# Patient Record
Sex: MALE | Race: WHITE | Employment: FULL TIME | URBAN - METROPOLITAN AREA
[De-identification: names, ages, dates, MRNs, and addresses within clinical notes are randomized per-mention and may not be internally consistent; named-entity substitution may affect disease eponyms.]

---

## 2019-02-21 ENCOUNTER — APPOINTMENT (OUTPATIENT)
Dept: GENERAL RADIOLOGY | Age: 64
End: 2019-02-21
Payer: COMMERCIAL

## 2019-02-21 ENCOUNTER — HOSPITAL ENCOUNTER (EMERGENCY)
Age: 64
Discharge: HOME | End: 2019-02-21
Attending: EMERGENCY MEDICINE
Payer: COMMERCIAL

## 2019-02-21 VITALS
SYSTOLIC BLOOD PRESSURE: 130 MMHG | WEIGHT: 221.56 LBS | BODY MASS INDEX: 33.58 KG/M2 | OXYGEN SATURATION: 97 % | HEART RATE: 57 BPM | HEIGHT: 68 IN | RESPIRATION RATE: 16 BRPM | TEMPERATURE: 97.8 F | DIASTOLIC BLOOD PRESSURE: 72 MMHG

## 2019-02-21 DIAGNOSIS — S46.812A STRAIN OF LEFT DELTOID MUSCLE, INITIAL ENCOUNTER: Primary | ICD-10-CM

## 2019-02-21 PROCEDURE — 73030 X-RAY EXAM OF SHOULDER: CPT

## 2019-02-21 PROCEDURE — 99283 EMERGENCY DEPT VISIT LOW MDM: CPT

## 2019-02-21 RX ORDER — LIDOCAINE 50 MG/G
1 PATCH TOPICAL DAILY
Qty: 30 PATCH | Refills: 0 | Status: SHIPPED | OUTPATIENT
Start: 2019-02-21 | End: 2019-03-23

## 2019-02-21 RX ORDER — METOPROLOL SUCCINATE 50 MG/1
TABLET, EXTENDED RELEASE ORAL
COMMUNITY
Start: 2018-11-26

## 2019-02-21 RX ORDER — LANOLIN ALCOHOL/MO/W.PET/CERES
5000 CREAM (GRAM) TOPICAL DAILY
COMMUNITY
End: 2020-11-02 | Stop reason: ALTCHOICE

## 2019-02-21 ASSESSMENT — PAIN DESCRIPTION - ORIENTATION: ORIENTATION: LEFT

## 2019-02-21 ASSESSMENT — PAIN SCALES - GENERAL
PAINLEVEL_OUTOF10: 4
PAINLEVEL_OUTOF10: 6

## 2019-02-21 ASSESSMENT — ENCOUNTER SYMPTOMS
COUGH: 0
RESPIRATORY NEGATIVE: 1
SHORTNESS OF BREATH: 0
GASTROINTESTINAL NEGATIVE: 1
DIARRHEA: 0
EYES NEGATIVE: 1
NAUSEA: 0
VOMITING: 0
BACK PAIN: 0
CONSTIPATION: 0
ABDOMINAL PAIN: 0

## 2019-02-21 ASSESSMENT — PAIN DESCRIPTION - DESCRIPTORS: DESCRIPTORS: ACHING

## 2019-02-21 ASSESSMENT — PAIN DESCRIPTION - LOCATION: LOCATION: SHOULDER

## 2019-02-21 ASSESSMENT — PAIN DESCRIPTION - PAIN TYPE: TYPE: ACUTE PAIN

## 2020-09-23 ENCOUNTER — OFFICE VISIT (OUTPATIENT)
Dept: ORTHOPEDIC SURGERY | Age: 65
End: 2020-09-23
Payer: COMMERCIAL

## 2020-09-23 VITALS — BODY MASS INDEX: 30.31 KG/M2 | WEIGHT: 200 LBS | HEIGHT: 68 IN

## 2020-09-23 PROCEDURE — 3017F COLORECTAL CA SCREEN DOC REV: CPT | Performed by: ORTHOPAEDIC SURGERY

## 2020-09-23 PROCEDURE — G8427 DOCREV CUR MEDS BY ELIG CLIN: HCPCS | Performed by: ORTHOPAEDIC SURGERY

## 2020-09-23 PROCEDURE — 99214 OFFICE O/P EST MOD 30 MIN: CPT | Performed by: ORTHOPAEDIC SURGERY

## 2020-09-23 PROCEDURE — 1036F TOBACCO NON-USER: CPT | Performed by: ORTHOPAEDIC SURGERY

## 2020-09-23 PROCEDURE — G8417 CALC BMI ABV UP PARAM F/U: HCPCS | Performed by: ORTHOPAEDIC SURGERY

## 2020-09-23 RX ORDER — IBUPROFEN 800 MG/1
800 TABLET ORAL 2 TIMES DAILY WITH MEALS
Qty: 60 TABLET | Refills: 1 | Status: SHIPPED | OUTPATIENT
Start: 2020-09-23

## 2020-09-23 NOTE — PROGRESS NOTES
Kvng Clarke  <[id number]>  September 23, 2020    Chief Complaint   Patient presents with    Shoulder Pain     left       History: The patient is a 72-year-old gentleman who is here for evaluation of his left shoulder. He is right-hand dominant. The patient reportedly was in the back of a large tractor trailer/semi-truck trailer. They were moving large windows and 3 of them destabilized and fell on to him. He reportedly was pinned by these large windows. His left ear was cut off partially. He was having left shoulder pain after the injury. He is right-hand dominant. He has no prior history of left shoulder issues. The pain in the left shoulder has persisted. He has difficulty with overhead activities. He has tried ibuprofen and Tylenol without much relief. He denies any numbness or tingling. The patient reportedly has had rotator cuff issues with his right shoulder in the remote past.  He has difficulty sleeping on his left side. The patient's  past medical history, medications, allergies,  family history, social history, and have been reviewed, and dated and are recorded in the chart. Pertinent items are noted in HPI. Review of systems reviewed from Pertinent History Form dated on 9/23/2020 and available in the patient's chart under the Media tab. Vitals:  Ht 5' 8\" (1.727 m)   Wt 200 lb (90.7 kg)   BMI 30.41 kg/m²     Physical: Physical: The patient presents today in no acute distress. He is alert and oriented to person, place and time. He displays appropriate mood and affect. He is well dressed, nourished and  groomed. He has a normal affect. Examination of the neck reveals no evidence of tenderness. Spurling's sign is negative. Active range of motion of the left shoulder is: 165 degrees abduction, 165 degrees forward flexion, 40 degrees of external rotation and internal rotation to L5.  Passive range of motion of the left shoulder is: 170 degrees abduction, 180 degrees forward flexion,

## 2020-10-02 ENCOUNTER — HOSPITAL ENCOUNTER (OUTPATIENT)
Dept: MRI IMAGING | Age: 65
Discharge: HOME | End: 2020-10-02
Payer: COMMERCIAL

## 2020-10-02 PROCEDURE — 73221 MRI JOINT UPR EXTREM W/O DYE: CPT

## 2020-10-09 ENCOUNTER — OFFICE VISIT (OUTPATIENT)
Dept: ORTHOPEDIC SURGERY | Age: 65
End: 2020-10-09
Payer: COMMERCIAL

## 2020-10-09 VITALS — HEIGHT: 68 IN | WEIGHT: 204 LBS | BODY MASS INDEX: 30.92 KG/M2 | TEMPERATURE: 97.4 F

## 2020-10-09 PROCEDURE — 99214 OFFICE O/P EST MOD 30 MIN: CPT | Performed by: ORTHOPAEDIC SURGERY

## 2020-10-09 PROCEDURE — G8417 CALC BMI ABV UP PARAM F/U: HCPCS | Performed by: ORTHOPAEDIC SURGERY

## 2020-10-09 PROCEDURE — 3017F COLORECTAL CA SCREEN DOC REV: CPT | Performed by: ORTHOPAEDIC SURGERY

## 2020-10-09 PROCEDURE — G8427 DOCREV CUR MEDS BY ELIG CLIN: HCPCS | Performed by: ORTHOPAEDIC SURGERY

## 2020-10-09 PROCEDURE — G8484 FLU IMMUNIZE NO ADMIN: HCPCS | Performed by: ORTHOPAEDIC SURGERY

## 2020-10-09 PROCEDURE — 1036F TOBACCO NON-USER: CPT | Performed by: ORTHOPAEDIC SURGERY

## 2020-10-09 NOTE — PROGRESS NOTES
Mandy Gitelman  <[id number]>  October 9, 2020    Chief Complaint   Patient presents with    Follow-up     MRI results, Left shoulder. History: The patient is a 81-year-old gentleman who is here for evaluation of his left shoulder. He is right-hand dominant. The patient reportedly was in the back of a large tractor trailer/semi-truck trailer. They were moving large windows and 3 of them destabilized and fell on to him. He reportedly was pinned by these large windows. His left ear was cut off partially. He was having left shoulder pain after the injury. He is right-hand dominant. He has no prior history of left shoulder issues. The pain in the left shoulder has persisted. He has difficulty with overhead activities. He has tried ibuprofen and Tylenol without much relief. He denies any numbness or tingling. The patient reportedly has had rotator cuff issues with his right shoulder in the remote past.  He has difficulty sleeping on his left side. This injury occurred over a year ago. The patient's  past medical history, medications, allergies,  family history, social history, and have been reviewed, and dated and are recorded in the chart. Pertinent items are noted in HPI. Review of systems reviewed from Pertinent History Form dated on 9/23/2020 and available in the patient's chart under the Media tab. Vitals:  Temp 97.4 °F (36.3 °C) (Infrared)   Ht 5' 8\" (1.727 m)   Wt 204 lb (92.5 kg)   BMI 31.02 kg/m²     Physical: Physical: The patient presents today in no acute distress. He is alert and oriented to person, place and time. He displays appropriate mood and affect. He is well dressed, nourished and  groomed. He has a normal affect. Examination of the neck reveals no evidence of tenderness. Spurling's sign is negative. Active range of motion of the left shoulder is: 165 degrees abduction, 165 degrees forward flexion, 40 degrees of external rotation and internal rotation to L5.  Passive range of motion of the left shoulder is: 170 degrees abduction, 180 degrees forward flexion, 50 degrees of external rotation and internal rotation to L4. Active and passive range of motion of the opposite shoulder is full. Examination of the left shoulder reveals positive Neer and Laws' impingement signs. There is mild subacromial crepitus with range of motion. Drop arm test is positive on the left. Negative bilaterally. Speed's test is negative. There is mild tenderness over the Bicipital groove. He  does have tenderness over the [facility] joint. Cross body adduction test is positive. He has moderate tenderness over the subacromial space. There is no evidence of scapular winging. Lift off sign is negative. There is no evidence of atrophy. External rotation strength is 3+/5. There are no skin lesions, cellulitis, or extreme edema in the upper extremities. Sensation is intact to axillary, median, radial, and ulnar nerves bilaterally. The patient has warm and well-perfused bilateral upper extremities with brisk capillary refill. Radial and ulnar pulses are palpable and 2+ bilaterally. X-rays: MRI of the left shoulder was extensively reviewed. The MRI does reveal evidence of a full-thickness tear involving the anterior aspect of the infraspinatus and a portion of the supraspinatus. There is evidence of circumferential fraying and tearing of the labrum. There is severe AC joint arthritis. There is moderate glenohumeral joint arthritis. Impression: #1 left shoulder rotator cuff tear #2 left shoulder AC joint arthritis #3 left shoulder labral tear    Plan: At this time we will schedule the patient for a left shoulder arthroscopy with labral debridement, open lux, subacromial decompression and rotator cuff repair. All risks including but not limited to blood loss, infection, persistent pain, increased pain, stiffness, weakness, neurovascular injury and the risks of anesthesia were discussed.  The patient understands all risks and benefits of the procedure and agrees to proceed.

## 2020-10-12 ENCOUNTER — TELEPHONE (OUTPATIENT)
Dept: ORTHOPEDIC SURGERY | Age: 65
End: 2020-10-12

## 2020-10-12 NOTE — TELEPHONE ENCOUNTER
CPT: [id number], [id number], [id number]  AUTHORIZATION: approved  BODY PART: left shoulder  DATE RANGE: 11/4/20-2/2/21    Submitted online 10/12/20    Approved #[id number] 10/15/20

## 2020-10-26 ENCOUNTER — TELEPHONE (OUTPATIENT)
Dept: ORTHOPEDIC SURGERY | Age: 65
End: 2020-10-26

## 2020-10-26 NOTE — TELEPHONE ENCOUNTER
I spoke to the patient and informed him that the only testing he needs to do is his COVID on 10/29/20.

## 2020-10-26 NOTE — TELEPHONE ENCOUNTER
Other Patient would like a call back to know if there is any testing he needs to have complete before surgery.  ph [phone]

## 2020-10-29 ENCOUNTER — OFFICE VISIT (OUTPATIENT)
Dept: PRIMARY CARE CLINIC | Age: 65
End: 2020-10-29
Payer: COMMERCIAL

## 2020-10-29 PROCEDURE — 99211 OFF/OP EST MAY X REQ PHY/QHP: CPT | Performed by: NURSE PRACTITIONER

## 2020-10-29 NOTE — PATIENT INSTRUCTIONS

## 2020-10-29 NOTE — PROGRESS NOTES
Andres Snow received a viral test for COVID-19. They were educated on isolation and quarantine as appropriate. For any symptoms, they were directed to seek care from their PCP, given contact information to establish with a doctor, directed to an urgent care or the emergency room.

## 2020-10-31 LAB — SARS-COV-2, NAA: NOT DETECTED

## 2020-10-31 NOTE — RESULT ENCOUNTER NOTE
Your Covid-19 test resulted not detected/negative. What happens if I have a negative test?  Remember to wash your hands often, avoid touching your face, stay 6 feet from people you do not live with, and wear a cloth facemask when you go out in public. A negative COVID-19 test at one point in time does not mean you will stay negative. You could become ill with COVID-19 and/or test positive at any time. If you are a close contact of a confirmed or suspected case, continue to stay home and away from others until 14 days after your last exposure. If you do not have symptoms and were not in close contact with a confirmed or suspected case, you can stop isolating. If you currently have symptoms of COVID-19 and were not in close contact with a confirmed or suspected case, you should keep monitoring symptoms and talk to your doctor or other healthcare provider about staying home and if you need to get tested again. If you develop symptoms of COVID-19, stay at home and away from others and talk to your doctor or other healthcare provider about getting tested again. For additional information, visit coronavirus. [place].gov. For answers to your COVID-19 questions, call 0-523-9-ASK-[facility] ([phone]).

## 2020-11-02 NOTE — PROGRESS NOTES
[facility] [facility] time___1300_________        Surgery time___1455_________    Take the following medications with a sip of water: Follow your MD/Surgeons pre-procedure instructions regarding your medications    Do not eat or drink anything after 12:00 midnight prior to your surgery. This includes water chewing gum, mints and ice chips. You may brush your teeth and gargle the morning of your surgery, but do not swallow the water     Please see your family doctor/pediatrician for a history and physical and/or concerning medications. Bring any test results/reports from your physicians office. If you are under the care of a heart doctor or specialist doctor, please be aware that you may be asked to them for clearance    You may be asked to stop blood thinners such as Coumadin, Plavix, Fragmin, Lovenox, etc., or any anti-inflammatories such as:  Aspirin, Ibuprofen, Advil, Naproxen prior to your surgery. We also ask that you stop any OTC medications such as fish oil, vitamin E, glucosamine, garlic, Multivitamins, COQ 10, etc.    We ask that you do not smoke 24 hours prior to surgery  We ask that you do not  drink any alcoholic beverages 24 hours prior to surgery     You must make arrangements for a responsible adult to take you home after your surgery. For your safety you will not be allowed to leave alone or drive yourself home. Your surgery will be cancelled if you do not have a ride home. Also for your safety, it is strongly suggested that someone stay with you the first 24 hours after your surgery. A parent or legal guardian must accompany a child scheduled for surgery and plan to stay at the hospital until the child is discharged. Please do not bring other children with you. For your comfort, please wear simple loose fitting clothing to the hospital.  Please do not bring valuables.     Do not wear any make-up or nail polish on your fingers or

## 2020-11-02 NOTE — PROGRESS NOTES
C-Difficile admission screening and protocol:     * Admitted with diarrhea? YES____    NO___X__     *Prior history of C-Diff. In last 3 months? YES____   NO__X___     *Antibiotic use in the past 6-8 weeks? NO_X_____YES______                 If yes which  ANTIBIOTIC AND REASON______     *Prior hospitalization or nursing home in the last month?  YES____   NO_X___

## 2020-11-02 NOTE — PROGRESS NOTES

## 2020-11-03 ENCOUNTER — ANESTHESIA EVENT (OUTPATIENT)
Dept: OPERATING ROOM | Age: 65
End: 2020-11-03
Payer: COMMERCIAL

## 2020-11-04 ENCOUNTER — ANESTHESIA (OUTPATIENT)
Dept: OPERATING ROOM | Age: 65
End: 2020-11-04
Payer: COMMERCIAL

## 2020-11-04 ENCOUNTER — HOSPITAL ENCOUNTER (OUTPATIENT)
Age: 65
Setting detail: OUTPATIENT SURGERY
Discharge: HOME | End: 2020-11-04
Attending: ORTHOPAEDIC SURGERY | Admitting: ORTHOPAEDIC SURGERY
Payer: COMMERCIAL

## 2020-11-04 VITALS
RESPIRATION RATE: 25 BRPM | SYSTOLIC BLOOD PRESSURE: 132 MMHG | DIASTOLIC BLOOD PRESSURE: 71 MMHG | OXYGEN SATURATION: 100 % | TEMPERATURE: 93.9 F

## 2020-11-04 VITALS
WEIGHT: 202.82 LBS | RESPIRATION RATE: 18 BRPM | DIASTOLIC BLOOD PRESSURE: 89 MMHG | BODY MASS INDEX: 30.04 KG/M2 | HEIGHT: 69 IN | OXYGEN SATURATION: 95 % | TEMPERATURE: 97.1 F | SYSTOLIC BLOOD PRESSURE: 132 MMHG | HEART RATE: 82 BPM

## 2020-11-04 LAB
GLUCOSE BLD-MCNC: 123 MG/DL (ref 70–99)
GLUCOSE BLD-MCNC: 165 MG/DL (ref 70–99)
PERFORMED ON: ABNORMAL
PERFORMED ON: ABNORMAL

## 2020-11-04 PROCEDURE — 7100000011 HC PHASE II RECOVERY - ADDTL 15 MIN: Performed by: ORTHOPAEDIC SURGERY

## 2020-11-04 PROCEDURE — 3700000001 HC ADD 15 MINUTES (ANESTHESIA): Performed by: ORTHOPAEDIC SURGERY

## 2020-11-04 PROCEDURE — L3650 SO 8 ABD RESTRAINT PRE OTS: HCPCS | Performed by: ORTHOPAEDIC SURGERY

## 2020-11-04 PROCEDURE — 7100000000 HC PACU RECOVERY - FIRST 15 MIN: Performed by: ORTHOPAEDIC SURGERY

## 2020-11-04 PROCEDURE — 2500000003 HC RX 250 WO HCPCS: Performed by: NURSE ANESTHETIST, CERTIFIED REGISTERED

## 2020-11-04 PROCEDURE — 2580000003 HC RX 258: Performed by: ANESTHESIOLOGY

## 2020-11-04 PROCEDURE — 3700000000 HC ANESTHESIA ATTENDED CARE: Performed by: ORTHOPAEDIC SURGERY

## 2020-11-04 PROCEDURE — 2500000003 HC RX 250 WO HCPCS: Performed by: ANESTHESIOLOGY

## 2020-11-04 PROCEDURE — 7100000010 HC PHASE II RECOVERY - FIRST 15 MIN: Performed by: ORTHOPAEDIC SURGERY

## 2020-11-04 PROCEDURE — 6360000002 HC RX W HCPCS: Performed by: NURSE ANESTHETIST, CERTIFIED REGISTERED

## 2020-11-04 PROCEDURE — 6360000002 HC RX W HCPCS: Performed by: ORTHOPAEDIC SURGERY

## 2020-11-04 PROCEDURE — 2500000003 HC RX 250 WO HCPCS: Performed by: ORTHOPAEDIC SURGERY

## 2020-11-04 PROCEDURE — 7100000001 HC PACU RECOVERY - ADDTL 15 MIN: Performed by: ORTHOPAEDIC SURGERY

## 2020-11-04 PROCEDURE — 3600000004 HC SURGERY LEVEL 4 BASE: Performed by: ORTHOPAEDIC SURGERY

## 2020-11-04 PROCEDURE — 2720000010 HC SURG SUPPLY STERILE: Performed by: ORTHOPAEDIC SURGERY

## 2020-11-04 PROCEDURE — 2580000003 HC RX 258: Performed by: NURSE ANESTHETIST, CERTIFIED REGISTERED

## 2020-11-04 PROCEDURE — C1713 ANCHOR/SCREW BN/BN,TIS/BN: HCPCS | Performed by: ORTHOPAEDIC SURGERY

## 2020-11-04 PROCEDURE — 2709999900 HC NON-CHARGEABLE SUPPLY: Performed by: ORTHOPAEDIC SURGERY

## 2020-11-04 PROCEDURE — 3600000014 HC SURGERY LEVEL 4 ADDTL 15MIN: Performed by: ORTHOPAEDIC SURGERY

## 2020-11-04 PROCEDURE — 64415 NJX AA&/STRD BRCH PLXS IMG: CPT | Performed by: ANESTHESIOLOGY

## 2020-11-04 PROCEDURE — 2580000003 HC RX 258: Performed by: ORTHOPAEDIC SURGERY

## 2020-11-04 DEVICE — ANCHOR SUT DIA2.9MM NO2 MAXBRAID DBL LD SFT W/ CUT NDL: Type: IMPLANTABLE DEVICE | Site: SHOULDER | Status: FUNCTIONAL

## 2020-11-04 RX ORDER — SODIUM CHLORIDE 0.9 % (FLUSH) 0.9 %
10 SYRINGE (ML) INJECTION PRN
Status: DISCONTINUED | OUTPATIENT
Start: 2020-11-04 | End: 2020-11-04 | Stop reason: HOSPADM

## 2020-11-04 RX ORDER — ONDANSETRON 2 MG/ML
INJECTION INTRAMUSCULAR; INTRAVENOUS PRN
Status: DISCONTINUED | OUTPATIENT
Start: 2020-11-04 | End: 2020-11-04 | Stop reason: SDUPTHER

## 2020-11-04 RX ORDER — ROCURONIUM BROMIDE 10 MG/ML
INJECTION, SOLUTION INTRAVENOUS PRN
Status: DISCONTINUED | OUTPATIENT
Start: 2020-11-04 | End: 2020-11-04 | Stop reason: SDUPTHER

## 2020-11-04 RX ORDER — FENTANYL CITRATE 50 UG/ML
INJECTION, SOLUTION INTRAMUSCULAR; INTRAVENOUS PRN
Status: DISCONTINUED | OUTPATIENT
Start: 2020-11-04 | End: 2020-11-04 | Stop reason: SDUPTHER

## 2020-11-04 RX ORDER — PROPOFOL 10 MG/ML
INJECTION, EMULSION INTRAVENOUS PRN
Status: DISCONTINUED | OUTPATIENT
Start: 2020-11-04 | End: 2020-11-04 | Stop reason: SDUPTHER

## 2020-11-04 RX ORDER — PHENYLEPHRINE HCL IN 0.9% NACL 1 MG/10 ML
SYRINGE (ML) INTRAVENOUS PRN
Status: DISCONTINUED | OUTPATIENT
Start: 2020-11-04 | End: 2020-11-04 | Stop reason: SDUPTHER

## 2020-11-04 RX ORDER — EPHEDRINE SULFATE 50 MG/ML
INJECTION INTRAVENOUS PRN
Status: DISCONTINUED | OUTPATIENT
Start: 2020-11-04 | End: 2020-11-04 | Stop reason: SDUPTHER

## 2020-11-04 RX ORDER — BUPIVACAINE HYDROCHLORIDE 5 MG/ML
INJECTION, SOLUTION EPIDURAL; INTRACAUDAL
Status: COMPLETED | OUTPATIENT
Start: 2020-11-04 | End: 2020-11-04

## 2020-11-04 RX ORDER — SUCCINYLCHOLINE/SOD CL,ISO/PF 200MG/10ML
SYRINGE (ML) INTRAVENOUS PRN
Status: DISCONTINUED | OUTPATIENT
Start: 2020-11-04 | End: 2020-11-04 | Stop reason: SDUPTHER

## 2020-11-04 RX ORDER — SODIUM CHLORIDE, SODIUM LACTATE, POTASSIUM CHLORIDE, CALCIUM CHLORIDE 600; 310; 30; 20 MG/100ML; MG/100ML; MG/100ML; MG/100ML
INJECTION, SOLUTION INTRAVENOUS CONTINUOUS PRN
Status: DISCONTINUED | OUTPATIENT
Start: 2020-11-04 | End: 2020-11-04 | Stop reason: SDUPTHER

## 2020-11-04 RX ORDER — LIDOCAINE HYDROCHLORIDE AND EPINEPHRINE 10; 10 MG/ML; UG/ML
INJECTION, SOLUTION INFILTRATION; PERINEURAL
Status: COMPLETED | OUTPATIENT
Start: 2020-11-04 | End: 2020-11-04

## 2020-11-04 RX ORDER — BUPIVACAINE HYDROCHLORIDE 5 MG/ML
INJECTION, SOLUTION EPIDURAL; INTRACAUDAL
Status: COMPLETED
Start: 2020-11-04 | End: 2020-11-04

## 2020-11-04 RX ORDER — SODIUM CHLORIDE 0.9 % (FLUSH) 0.9 %
10 SYRINGE (ML) INJECTION EVERY 12 HOURS SCHEDULED
Status: DISCONTINUED | OUTPATIENT
Start: 2020-11-04 | End: 2020-11-04 | Stop reason: HOSPADM

## 2020-11-04 RX ORDER — GLYCOPYRROLATE 0.2 MG/ML
INJECTION INTRAMUSCULAR; INTRAVENOUS PRN
Status: DISCONTINUED | OUTPATIENT
Start: 2020-11-04 | End: 2020-11-04 | Stop reason: SDUPTHER

## 2020-11-04 RX ORDER — LIDOCAINE HYDROCHLORIDE 20 MG/ML
INJECTION, SOLUTION EPIDURAL; INFILTRATION; INTRACAUDAL; PERINEURAL PRN
Status: DISCONTINUED | OUTPATIENT
Start: 2020-11-04 | End: 2020-11-04 | Stop reason: SDUPTHER

## 2020-11-04 RX ORDER — DEXAMETHASONE SODIUM PHOSPHATE 4 MG/ML
INJECTION, SOLUTION INTRA-ARTICULAR; INTRALESIONAL; INTRAMUSCULAR; INTRAVENOUS; SOFT TISSUE PRN
Status: DISCONTINUED | OUTPATIENT
Start: 2020-11-04 | End: 2020-11-04 | Stop reason: SDUPTHER

## 2020-11-04 RX ORDER — SODIUM CHLORIDE 9 MG/ML
INJECTION, SOLUTION INTRAVENOUS CONTINUOUS
Status: DISCONTINUED | OUTPATIENT
Start: 2020-11-04 | End: 2020-11-04 | Stop reason: HOSPADM

## 2020-11-04 RX ORDER — LIDOCAINE HYDROCHLORIDE 20 MG/ML
INJECTION, SOLUTION EPIDURAL; INFILTRATION; INTRACAUDAL; PERINEURAL
Status: DISCONTINUED
Start: 2020-11-04 | End: 2020-11-04 | Stop reason: HOSPADM

## 2020-11-04 RX ORDER — MIDAZOLAM HYDROCHLORIDE 1 MG/ML
INJECTION INTRAMUSCULAR; INTRAVENOUS
Status: DISCONTINUED
Start: 2020-11-04 | End: 2020-11-04 | Stop reason: HOSPADM

## 2020-11-04 RX ORDER — OXYCODONE HYDROCHLORIDE 5 MG/1
5 TABLET ORAL EVERY 6 HOURS PRN
Qty: 28 TABLET | Refills: 0 | Status: SHIPPED | OUTPATIENT
Start: 2020-11-04 | End: 2020-11-11

## 2020-11-04 RX ADMIN — ROCURONIUM BROMIDE 35 MG: 10 INJECTION INTRAVENOUS at 13:57

## 2020-11-04 RX ADMIN — ROCURONIUM BROMIDE 5 MG: 10 INJECTION INTRAVENOUS at 13:47

## 2020-11-04 RX ADMIN — Medication 100 MCG: at 14:02

## 2020-11-04 RX ADMIN — Medication 100 MCG: at 13:57

## 2020-11-04 RX ADMIN — ONDANSETRON 4 MG: 2 INJECTION INTRAMUSCULAR; INTRAVENOUS at 13:58

## 2020-11-04 RX ADMIN — Medication 100 MCG: at 14:08

## 2020-11-04 RX ADMIN — SODIUM CHLORIDE: 9 INJECTION, SOLUTION INTRAVENOUS at 12:18

## 2020-11-04 RX ADMIN — Medication 140 MG: at 13:48

## 2020-11-04 RX ADMIN — BUPIVACAINE HYDROCHLORIDE 30 ML: 5 INJECTION, SOLUTION EPIDURAL; INTRACAUDAL; PERINEURAL at 13:29

## 2020-11-04 RX ADMIN — DEXAMETHASONE SODIUM PHOSPHATE 8 MG: 4 INJECTION, SOLUTION INTRAMUSCULAR; INTRAVENOUS at 13:51

## 2020-11-04 RX ADMIN — SUGAMMADEX 200 MG: 100 INJECTION, SOLUTION INTRAVENOUS at 14:55

## 2020-11-04 RX ADMIN — EPHEDRINE SULFATE 10 MG: 50 INJECTION INTRAVENOUS at 14:53

## 2020-11-04 RX ADMIN — CEFAZOLIN SODIUM 2 G: 10 INJECTION, POWDER, FOR SOLUTION INTRAVENOUS at 13:51

## 2020-11-04 RX ADMIN — FENTANYL CITRATE 50 MCG: 50 INJECTION INTRAMUSCULAR; INTRAVENOUS at 13:47

## 2020-11-04 RX ADMIN — EPHEDRINE SULFATE 10 MG: 50 INJECTION INTRAVENOUS at 14:16

## 2020-11-04 RX ADMIN — GLYCOPYRROLATE 0.2 MG: 0.2 INJECTION, SOLUTION INTRAMUSCULAR; INTRAVENOUS at 14:08

## 2020-11-04 RX ADMIN — LIDOCAINE HYDROCHLORIDE 100 MG: 20 INJECTION, SOLUTION EPIDURAL; INFILTRATION; INTRACAUDAL; PERINEURAL at 13:47

## 2020-11-04 RX ADMIN — PROPOFOL 180 MG: 10 INJECTION, EMULSION INTRAVENOUS at 13:47

## 2020-11-04 RX ADMIN — FENTANYL CITRATE 50 MCG: 50 INJECTION INTRAMUSCULAR; INTRAVENOUS at 15:14

## 2020-11-04 RX ADMIN — SODIUM CHLORIDE, SODIUM LACTATE, POTASSIUM CHLORIDE, AND CALCIUM CHLORIDE: .6; .31; .03; .02 INJECTION, SOLUTION INTRAVENOUS at 14:45

## 2020-11-04 RX ADMIN — EPHEDRINE SULFATE 10 MG: 50 INJECTION INTRAVENOUS at 14:38

## 2020-11-04 ASSESSMENT — PULMONARY FUNCTION TESTS
PIF_VALUE: 18
PIF_VALUE: 18
PIF_VALUE: 19
PIF_VALUE: 18
PIF_VALUE: 1
PIF_VALUE: 18
PIF_VALUE: 18
PIF_VALUE: 17
PIF_VALUE: 18
PIF_VALUE: 14
PIF_VALUE: 18
PIF_VALUE: 19
PIF_VALUE: 2
PIF_VALUE: 18
PIF_VALUE: 17
PIF_VALUE: 18
PIF_VALUE: 0
PIF_VALUE: 19
PIF_VALUE: 17
PIF_VALUE: 18
PIF_VALUE: 17
PIF_VALUE: 18
PIF_VALUE: 2
PIF_VALUE: 18
PIF_VALUE: 1
PIF_VALUE: 0
PIF_VALUE: 17
PIF_VALUE: 18
PIF_VALUE: 3
PIF_VALUE: 18
PIF_VALUE: 18
PIF_VALUE: 1
PIF_VALUE: 18
PIF_VALUE: 19
PIF_VALUE: 18
PIF_VALUE: 2
PIF_VALUE: 14
PIF_VALUE: 14
PIF_VALUE: 2
PIF_VALUE: 18
PIF_VALUE: 0
PIF_VALUE: 6
PIF_VALUE: 18
PIF_VALUE: 18
PIF_VALUE: 3
PIF_VALUE: 18
PIF_VALUE: 18
PIF_VALUE: 3
PIF_VALUE: 18
PIF_VALUE: 16
PIF_VALUE: 17
PIF_VALUE: 18
PIF_VALUE: 1
PIF_VALUE: 19
PIF_VALUE: 18
PIF_VALUE: 17
PIF_VALUE: 18

## 2020-11-04 ASSESSMENT — ENCOUNTER SYMPTOMS: SHORTNESS OF BREATH: 0

## 2020-11-04 ASSESSMENT — PAIN SCALES - GENERAL
PAINLEVEL_OUTOF10: 0

## 2020-11-04 ASSESSMENT — PAIN - FUNCTIONAL ASSESSMENT
PAIN_FUNCTIONAL_ASSESSMENT: PREVENTS OR INTERFERES SOME ACTIVE ACTIVITIES AND ADLS
PAIN_FUNCTIONAL_ASSESSMENT: 0-10

## 2020-11-04 ASSESSMENT — PAIN DESCRIPTION - DESCRIPTORS: DESCRIPTORS: ACHING

## 2020-11-04 NOTE — PROGRESS NOTES
Pt arrived to PACU from OR. Pt sleeping on 3l/nc with oral airway in place. Left shoulder dressings C/D/I. +pulses noted.  VSS

## 2020-11-04 NOTE — OP NOTE
Operative Note      Patient: Zonia Gutiérrez  YOB: 1955  MRN: [id number]    Date of Procedure: 11/4/2020    Pre-Op Diagnosis: LABRAL TEAR, ACROMIOCLAVICULAR ARTHRITIS, AND ROTATOR CUFF TEAR-LEFT SHOULDER    Post-Op Diagnosis: Same       Procedure(s):  ARTHROSCOPY LABRAL DEBRIDEMENT OPEN JUNO SUBACROMIAL DECOMPRESSION AND ROTATOR CUFF REPAIR-LEFT SHOULDER    Surgeon(s):  Ammon Crouch MD    Assistant:   Surgical Assistant: Keisha Reyes RN    Anesthesia: General    Estimated Blood Loss (mL): less than 409     Complications: None    Specimens:   * No specimens in log *    Implants:  * No implants in log *      Drains: * No LDAs found *    Findings: moderate sized left shoulder rotator cuff tear. Detailed Description of Procedure:    PATIENT NAME:         Zonia Gutiérrez  YOB: 1955   MEDICAL RECORD#         [id number]  SURGERY DATE:         11/4/2020  SURGEON:                 Ammon Crouch  FIRST-ASSISTANT  Camila Aaron PA-C    PREOPERATIVE DIAGNOSIS:   1. Left shoulder rotator cuff tear. 2. Left shoulder superior labral tear. 3. Left shoulder acromioclavicular joint arthritis. 4.  Left shoulder glenohumeral arthritis    POSTOPERATIVE DIAGNOSIS:   1. Left shoulder medium full thickness rotator cuff tear. 2. Left shoulder complex superior labral tear. 3. Left shoulder acromioclavicular joint arthritis. 4.  Left shoulder glenohumeral arthritis with grade 3-4 defect within the central aspect of the glenoid. This defect measured approximately 1 cm x 1 cm. PROCEDURE:   1. Left shoulder diagnostic arthroscopy. 2. Left shoulder arthroscopy with superior labral debridement. 3.  Left shoulder arthroscopy with chondroplasty of the glenoid. 4. Left shoulder open rotator cuff repair. 5. Left shoulder open Juno. 6. Left shoulder open subacromial decompression. ANESTHESIA: Interscalene block and general endotracheal anesthesia. IV FLUIDS: Crystalloid. ESTIMATED BLOOD LOSS: 100 mL. COMPLICATIONS: None. The patient tolerated the procedure quite well. INDICATIONS: The patient is a 60 y.o. male with a longstanding history   of left shoulder pain. The patient reportedly was moving 3 large windows in the back of a semitruck trailer. The windows somehow destabilized and fell onto his left shoulder and he was pinned. This occurred quite some time ago. The patient has had persistent left shoulder pain since the injury. He was ultimately seen in my office and evaluated. We ultimately obtained an MRI scan of the left shoulder. The MRI revealed findings consistent with a full thickness rotator cuff tear. The MRI confirmed AC joint arthritis and mild to moderate glenohumeral arthritis. There was diffuse labral fraying noted as well. Despite conservative treatment, the patient had severe persistent pain. Due to his symptoms and findings the patient was ultimately cleared and scheduled for surgery. OPERATIVE REPORT:   The patient was identified preoperatively. Interscalene block was administered by Anesthesia. The patient was then   taken to the operating room and general endotracheal anesthesia was   administered by Anesthesia. Appropriate preoperative antibiotics were administered per SCIP measures. The patient was positioned in the beach chair position accordingly. The left upper extremity and shoulder was then chloraprepped in standard fashion. We then draped out in the standard fashion. We then marked out all bony prominences. We then began diagnostic arthroscopy utilizing a standard posterior portal. We noted a negative drive-through sign. We then noted complex tearing to the superior labrum extending anteriorly. We then started an anterior working portal and the biceps anchor, itself, was stable. There was diffuse chondromalacia involving the glenoid.   There was area of full-thickness grade IV chondromalacia involving the central aspect of the glenoid. There is an associated flap of cartilage in this area. We went ahead and used our arthroscopic shaver and performed chondroplasty to the defect. We debrided all loose and unstable cartilage. This worked out rather well. There was no evidence of chondromalacia involving the humeral head. We then used our arthroscopic shaver, and gently debrided the labrum. We then used our TurboVac ArthroCare 90, and   contoured the labrum to a stable rim. We then looked out laterally and there   was evidence of a full thickness rotator cuff tear. We then were ready for   conversion to an open procedure. We made an oblique incision centered over the [facility] joint. We infiltrated the incision site with 0.25% Marcaine with epinephrine. We incised skin and coagulated all bleeders with Bovie electrocautery. We dissected down and   elevated our skin flaps both anteriorly and posteriorly. We then split the   fascia over the clavicle and over the [facility] joint. We elevated the fascia both   anteriorly and posteriorly. We carried our fascial split down onto the   acromion and took down the anterior deltoid and the CA ligament. We then   used our MicroChoice saw and resected the distal 8 mm of the clavicle. We   then used our MicroChoice saw and resected the anterior inferior portion of   the acromion. We used our rasp and created a type 1 acromion. We did resect   some of the hypertrophic bursal tissue. We then evaluated the rotator cuff,   and there was evidence of a full thickness rotator cuff tear with mild   retraction. There also was a longitudinal split within the supraspinatus tendon. The split in the supraspinatus was repaired with a #2 Ethibond suture. We repaired this with a figure-of-eight #2 Ethibond suture. We then directed our attention towards the repair. The tear was a medium sized tear involving the majority of the supraspinatus and a portion of the infraspinatus. We mobilized the tear, and passed a retention stitch into the edge. We then debrided the footprint. This worked out extremely well. We then were ready for passage of our anchors. We used 1 of the [employer] juggerknot anchors. We then passed all sutures through the torn margin of the rotator cuff. We then tied our sutures, and we were satisfied with an extremely secure repair. A lateral row repair was not necessary. We irrigated all layers rather copiously. We then were ready for closure. We closed the fascia over the [facility] joint with interrupted figure-of-8 #1 Vicryl   stitches. We then went ahead and reattached the deltoid directly back to   bone with interrupted vertical mattress #2 Ethibond stitches. We reinforced   the deltoid split with interrupted figure-of-8 #1 Vicryl stiches. We closed   the subcutaneous tissues with interrupted 2-0 simple Vicryl stitches. We   then closed the skin with a running 4-0 subcuticular Monocryl stitch. Sterile dressings were applied. There were no complications. The patient was put in a shoulder immobilizer.         Electronically signed by Carol Gomez MD on 11/4/2020 at 1:28 PM

## 2020-11-04 NOTE — ANESTHESIA PROCEDURE NOTES
Peripheral Block    Patient location during procedure: pre-op  Staffing  Anesthesiologist: Bernardo Machado MD  Performed: anesthesiologist   Preanesthetic Checklist  Completed: patient identified, site marked, surgical consent, pre-op evaluation, timeout performed, IV checked, risks and benefits discussed, monitors and equipment checked, anesthesia consent given, oxygen available and patient being monitored  Peripheral Block  Patient position: sitting  Prep: ChloraPrep  Patient monitoring: cardiac monitor, continuous pulse ox, frequent blood pressure checks and IV access  Block type: Brachial plexus  Laterality: left  Injection technique: single-shot  Procedures: ultrasound guided  Local infiltration: lidocaine  Infiltration strength: 1 %  Dose: 3 mL  Interscalene  Provider prep: mask and sterile gloves  Local infiltration: lidocaine  Needle  Needle type: combined needle/nerve stimulator   Needle gauge: 21 G  Needle length: 10 cm  Needle localization: ultrasound guidance  Assessment  Injection assessment: negative aspiration for heme, no paresthesia on injection and local visualized surrounding nerve on ultrasound  Paresthesia pain: none  Slow fractionated injection: yes  Hemodynamics: stable  Additional Notes  Immediately prior to procedure a \"time out\" was called to verify the correct patient, allergies, laterality, procedure and equipment. Time out performed with  RN    Local Anesthetic: 0.5 %  Bupivacaine   Amount: 30 ml  in 5 ml increments after negative aspiration each time. Anterior scalene and middle scalene muscles, upper, middle and lower trunks of the brachial plexus are identified, the tip of the needle and the spread of the local anesthetic around the brachial plexus are visualized. The Brachial plexus appeared to be anatomically normal and there were no abnormal pathologically findings seen.      Versed 2mg IV      Medications Administered  Bupivacaine (PF) (MARCAINE) 0.5 % injection, 30 mL  Reason for block: post-op pain management and at surgeon's request

## 2020-11-04 NOTE — H&P
The patient was interviewed and examined and there have been no changes since the documented History and Physical.  The patient was counseled at length about the risks of abhi Covid-19 during their perioperative period and any recovery window from their procedure. The patient was made aware that abhi Covid-19  may worsen their prognosis for recovering from their procedure  and lend to a higher morbidity and/or mortality risk. All material risks, benefits, and reasonable alternatives including postponing the procedure were discussed. The patient does wish to proceed with the procedure at this time.       Electronically signed by Renata Glass MD on 11/4/2020 at 1:27 PM

## 2020-11-04 NOTE — ANESTHESIA POSTPROCEDURE EVALUATION
Department of Anesthesiology  Postprocedure Note    Patient: Alyssa Jose  MRN: [id number]  YOB: 1955  Date of evaluation: 11/4/2020  Time:  4:08 PM     Procedure Summary     Date:  11/04/20 Room / Location:  [address] [place] [address]    Anesthesia Start:  9660 Anesthesia Stop:  4358    Procedure:  ARTHROSCOPY LABRAL DEBRIDEMENT , CHONDROPLASTY OF GLENOID,OPEN GRABIEL SUBACROMIAL DECOMPRESSION AND ROTATOR CUFF REPAIR-LEFT SHOULDER (Left Shoulder) Diagnosis:  (LABRAL TEAR, ACROMIOCLAVICULAR ARTHRITIS, AND ROTATOR CUFF TEAR-LEFT SHOULDER)    Surgeon:  Margarita Morrison MD Responsible Provider:  Bernardo Machado MD    Anesthesia Type:  general ASA Status:  2          Anesthesia Type: general    Dany Phase I: Dany Score: 8    Dany Phase II:      Last vitals: Reviewed and per EMR flowsheets.        Anesthesia Post Evaluation    Patient location during evaluation: PACU  Level of consciousness: awake and alert  Airway patency: patent  Nausea & Vomiting: no nausea and no vomiting  Complications: no  Cardiovascular status: blood pressure returned to baseline  Respiratory status: acceptable  Hydration status: euvolemic  Comments: Postoperative Anesthesia Note    Name:    Alyssa Jose  MRN:      [id number]    Patient Vitals in the past 12 hrs:  11/04/20 1558, Pulse:74, Resp:15, SpO2:97 %  11/04/20 1540, Pulse:76, Resp:18, SpO2:97 %  11/04/20 1537, BP:(!) 145/83, Pulse:74, Resp:19, SpO2:97 %  11/04/20 1536, Pulse:73, Resp:19, SpO2:96 %  11/04/20 1532, BP:(!) 142/81, Pulse:73, Resp:11, SpO2:96 %  11/04/20 1525, BP:(!) 141/79, Pulse:73, Resp:11, SpO2:98 %  11/04/20 1522, BP:(!) 147/82, Pulse:74, Resp:10, SpO2:98 %  11/04/20 1513, BP:(!) 160/85, Temp:97.6 °F (36.4 °C), Temp src:Temporal, Pulse:75, Resp:10, SpO2:98 %  11/04/20 1222, BP:127/75, Temp:97 °F (36.1 °C), Temp src:Temporal, Pulse:61, Resp:14, SpO2:97 %  11/04/20 1204, Weight:202 lb 13.2 oz (92 kg)     LABS:    CBC  Lab Results       Component                Value               Date/Time                  WBC                      13.8 (H)            09/02/2013 11:59 AM        HGB                      16.3                09/02/2013 11:59 AM        HCT                      48.4                09/02/2013 11:59 AM        PLT                      325                 09/02/2013 11:59 AM   RENAL  Lab Results       Component                Value               Date/Time                  NA                       137                 09/02/2013 11:59 AM        K                        3.3 (L)             09/02/2013 11:59 AM        CL                       100                 09/02/2013 11:59 AM        CO2                      28                  09/02/2013 11:59 AM        BUN                      20 (H)              09/02/2013 11:59 AM        CREATININE               1.3                 09/02/2013 11:59 AM        GLUCOSE                  151 (H)             09/02/2013 11:59 AM   COAGS  No results found for: PROTIME, INR, APTT    Intake & Output:  @31HXLX@    Nausea & Vomiting:  No    Level of Consciousness:  Awake    Pain Assessment:  Adequate analgesia    Anesthesia Complications:  No apparent anesthetic complications    SUMMARY      Vital signs stable  OK to discharge from Stage I post anesthesia care.   Care transferred from Anesthesiology department on discharge from perioperative area

## 2020-11-04 NOTE — ANESTHESIA PRE PROCEDURE
[facility] Department of Anesthesiology  Pre-Anesthesia Evaluation/Consultation       Name:  Zora Doan  : 1955  Age:  60 y.o. MRN:  [id number]  Date: 2020           Surgeon: Surgeon(s):  Parmjit Paul MD    Procedure: Procedure(s):  ARTHROSCOPY LABRAL DEBRIDEMENT OPEN GRABIEL SUBACROMIAL DECOMPRESSION AND ROTATOR CUFF REPAIR-LEFT SHOULDER     No Known Allergies  There is no problem list on file for this patient. Past Medical History:   Diagnosis Date    Arthritis     Diabetes mellitus ([address]     History of kidney stones     Hyperlipidemia     Hypertension     Wears glasses     reading     Past Surgical History:   Procedure Laterality Date    ABSCESS DRAINAGE      of pubic hair in scrotum    LITHOTRIPSY       Social History     Tobacco Use    Smoking status: Never Smoker    Smokeless tobacco: Never Used   Substance Use Topics    Alcohol use: No    Drug use: Never     Medications  No current facility-administered medications on file prior to encounter.       Current Outpatient Medications on File Prior to Encounter   Medication Sig Dispense Refill    ibuprofen (ADVIL;MOTRIN) 800 MG tablet Take 1 tablet by mouth 2 times daily (with meals) (Patient taking differently: Take 800 mg by mouth See Admin Instructions Takes every third day @ night) 60 tablet 1    metFORMIN (GLUCOPHAGE) 500 MG tablet Take 1,000 mg by mouth 2 times daily (with meals)   0    metoprolol succinate (TOPROL XL) 50 MG extended release tablet TAKE 1 TABLET BY MOUTH DAILY      allopurinol (ZYLOPRIM) 100 MG tablet Take 100 mg by mouth daily      lisinopril (PRINIVIL;ZESTRIL) 10 MG tablet Take 10 mg by mouth daily      hydrochlorothiazide (HYDRODIURIL) 25 MG tablet Take 25 mg by mouth daily      pravastatin (PRAVACHOL) 40 MG tablet Take 40 mg by mouth daily      hydrOXYzine (ATARAX) 25 MG tablet Take 25 mg by mouth every other day ev      aspirin 81 MG tablet Take 81 mg by mouth daily       Current Facility-Administered Medications   Medication Dose Route Frequency Provider Last Rate Last Dose    ceFAZolin (ANCEF) 2 g in dextrose 5 % 100 mL IVPB  2 g Intravenous Once Samra Ambrocio MD        0.9 % sodium chloride infusion   Intravenous Continuous Raya Bateman  mL/hr at 20 1218      sodium chloride flush 0.9 % injection 10 mL  10 mL Intravenous 2 times per day Raya Bateman MD        sodium chloride flush 0.9 % injection 10 mL  10 mL Intravenous PRN Raya Bateman MD        [COMPLETED] sodium chloride 0.9 % 3,000 mL with EPINEPHrine (EPINEPHrine HCL) 1 mg    Once PRN Samra Ambrocio MD   6,000 mL at 20 1330     Vital Signs (Current)   Vitals:    20 1414 20 1204 20 1222   BP:   127/75   Pulse:   61   Resp:   14   Temp:   97 °F (36.1 °C)   TempSrc:   Temporal   SpO2:   97%   Weight: 204 lb (92.5 kg) 202 lb 13.2 oz (92 kg)    Height: 5' 8.5\" (1.74 m)                                            BP Readings from Last 3 Encounters:   20 127/75   19 130/72     Vital Signs Statistics (for past 48 hrs)     Temp  Av °F (36.1 °C)  Min: 97 °F (36.1 °C)   Min taken time: 20 1222  Max: 97 °F (36.1 °C)   Max taken time: 20 1222  Pulse  Av  Min: 64   Min taken time: 20 1222  Max: 64   Max taken time: 20 1222  Resp  Av  Min: 15   Min taken time: 20 1222  Max: 14   Max taken time: 20 1222  BP  Min: 127/75   Min taken time: 20 1222  Max: 127/75   Max taken time: 20 1222  SpO2  Av %  Min: 97 %   Min taken time: 20 1222  Max: 97 %   Max taken time: 20 1222  BP Readings from Last 3 Encounters:   20 127/75   19 130/72       BMI  Body mass index is 30.39 kg/m². Estimated body mass index is 30.39 kg/m² as calculated from the following:    Height as of this encounter: 5' 8.5\" (1.74 m).     Weight as of this encounter: 202 lb 13.2 oz (92 kg).    CBC   Lab Results   Component Value Date    WBC 13.8 09/02/2013    RBC 5.21 09/02/2013    HGB 16.3 09/02/2013    HCT 48.4 09/02/2013    MCV 92.9 09/02/2013    RDW 12.2 09/02/2013     09/02/2013     CMP    Lab Results   Component Value Date     09/02/2013    K 3.3 09/02/2013     09/02/2013    CO2 28 09/02/2013    BUN 20 09/02/2013    CREATININE 1.3 09/02/2013    GFRAA >60 09/02/2013    AGRATIO 1.4 09/02/2013    LABGLOM >60 09/02/2013    GLUCOSE 151 09/02/2013    PROT 8.0 09/02/2013    CALCIUM 10.0 09/02/2013    BILITOT 1.00 09/02/2013    ALKPHOS 73 09/02/2013    AST 38 09/02/2013    ALT 59 09/02/2013     BMP    Lab Results   Component Value Date     09/02/2013    K 3.3 09/02/2013     09/02/2013    CO2 28 09/02/2013    BUN 20 09/02/2013    CREATININE 1.3 09/02/2013    CALCIUM 10.0 09/02/2013    GFRAA >60 09/02/2013    LABGLOM >60 09/02/2013    GLUCOSE 151 09/02/2013     POCGlucose  No results for input(s): GLUCOSE in the last 72 hours.    Coags  No results found for: PROTIME, INR, APTT  HCG (If Applicable) No results found for: PREGTESTUR, PREGSERUM, HCG, HCGQUANT   ABGs No results found for: PHART, PO2ART, XYO2EDA, IGR6GGW, BEART, B5DOHFRD   Type & Screen (If Applicable)  No results found for: LABABO, LABRH                         BMI: Wt Readings from Last 3 Encounters:       NPO Status:   Date of last liquid consumption: 11/03/20   Time of last liquid consumption: 2100   Date of last solid food consumption: 11/03/20      Time of last solid consumption: 2100       Anesthesia Evaluation  Patient summary reviewed  Airway: Mallampati: II  TM distance: >3 FB   Neck ROM: full  Mouth opening: > = 3 FB Dental: normal exam         Pulmonary:       (-) COPD, asthma and shortness of breath                           Cardiovascular:    (+) hypertension:,     (-) valvular problems/murmurs, past MI, CAD, CABG/stent, dysrhythmias and  angina                Neuro/Psych:      (-) seizures, TIA and CVA           GI/Hepatic/Renal:        (-) GERD, PUD, liver disease and no renal disease       Endo/Other:    (+) DiabetesType II DM, well controlled, , : arthritis:., .                 Abdominal:           Vascular: negative vascular ROS. Anesthesia Plan      general     ASA 2     (I discussed with the patient the risks and benefits of PIV, general anesthesia, IV Narcotics, PACU. All questions were answered the patient agrees with the plan.)  Induction: intravenous. Anesthetic plan and risks discussed with patient. Plan discussed with CRNA. This pre-anesthesia assessment may be used as a history and physical.    DOS STAFF ADDENDUM:    Pt seen and examined, chart reviewed (including anesthesia, drug and allergy history). No interval changes to history and physical examination. Anesthetic plan, risks, benefits, alternatives, and personnel involved discussed with patient. Patient verbalized an understanding and agrees to proceed.       Osiris Arana MD  November 4, 2020  12:44 PM

## 2020-11-04 NOTE — PROGRESS NOTES
Tolerating oral intake and being up in chair. No complaints. Left fingers remain tingly, mobile. Left arm in shoulder immobilizer. Discharge instructions given to patient and wife. Verbalize understanding. Script given.

## 2020-11-10 ENCOUNTER — OFFICE VISIT (OUTPATIENT)
Dept: ORTHOPEDIC SURGERY | Age: 65
End: 2020-11-10

## 2020-11-10 VITALS — TEMPERATURE: 97.5 F | BODY MASS INDEX: 30.04 KG/M2 | HEIGHT: 69 IN | WEIGHT: 202.82 LBS

## 2020-11-10 PROCEDURE — 99024 POSTOP FOLLOW-UP VISIT: CPT | Performed by: ORTHOPAEDIC SURGERY

## 2020-11-10 NOTE — PROGRESS NOTES
sling. He is aware that activities should be limited until full range of motion and comfort have been regained. I have explained the time course and likely expectations for maximal recovery of motion and function. He will follow up with me in 4 weeks. We will plan on getting active range of motion and light strengthening after next visit.

## 2020-11-11 ENCOUNTER — HOSPITAL ENCOUNTER (OUTPATIENT)
Dept: PHYSICAL THERAPY | Age: 65
Setting detail: THERAPIES SERIES
Discharge: HOME | End: 2020-11-11
Payer: COMMERCIAL

## 2020-11-11 PROCEDURE — 97162 PT EVAL MOD COMPLEX 30 MIN: CPT

## 2020-11-11 PROCEDURE — 97530 THERAPEUTIC ACTIVITIES: CPT

## 2020-11-11 PROCEDURE — 97110 THERAPEUTIC EXERCISES: CPT

## 2020-11-12 NOTE — PROGRESS NOTES
Physical Therapy  Initial Assessment  Date: 2020  Patient Name: Esperanza Vincent  MRN: [id number]  : 1955     Treatment Diagnosis: s/p L Rotator Cuff Repair, L shoulder Pain, Decreased Functional Mobility    Restrictions  Restrictions/Precautions: Fall Risk(Low)  Other position/activity restrictions: Rotator Cuff Repair Protocol    Subjective   Chart Reviewed: Yes  Patient assessed for rehabilitation services?: Yes  Referring Practitioner: Chris Manrique MD  Referral Date : 11/10/20  Diagnosis: s/p Left Rotator Cuff Repair Z98.890  PT Visit Information  Onset Date: 20  PT Insurance Information: [facility] [employer], allowed 30 PT visits per year, no prior authorization required  Subjective: Patient reports a history of Left shoulder pain x 1 year. \"If I did anything physical with my arm it would hurt. I couldn't sleep some nights\"  Patient saw Dr. 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 Patient underwent arthroscopic RTC repair, Juno, chondroplasty, labral debridement at [facility] on 20. Patient reports  pain primarilly after block wore off, but has improved. Patient has used pain medication and/or ibuprofen prn, sparingly. Patient is not able to sleep, patient is using R UE for ADLs. Patient is limiting activities due to L shoulder. patient is off work at present.    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, etc)  Leisure & Hobbies: rehabing houses    Objective  Posture: Fair  Palpation: tenderness noted L AC joint, insertion of rotator cuff, upper trap, lev scap, biceps  Scar: healing, butterfly strips covering incision  RUE AROM : WFL(Patient limited to approximately 150 flex, abd)  LUE General PROM: L shoulder limited to 30 degrees flex; elbow flex/ext, pron/sup, wrist flex/ext \"tight\" at end ROM  LUE General AROM: Not Tested  Strength RUE: WFL  Strength LUE: (Not Tested)  Overall Sensation Status: WFL(B UEs intact to light touch)    Assessment   Conditions Requiring Skilled Therapeutic Intervention  Body structures, Functions, Activity limitations: Decreased functional mobility ; Decreased high-level IADLs;Decreased ADL status; Decreased endurance;Decreased ROM; Decreased strength; Increased pain  Assessment: Patient presents with decreased functional mobility consistent with s/p L Rotator Cuff Repair. Patient would benefit from PT to increase functional mobility.     Prior Level of Function:  Independent  Treatment Diagnosis: s/p L Rotator Cuff Repair, L shoulder Pain, Decreased Functional Mobility  Prognosis: Good  Decision Making: Medium Complexity  REQUIRES PT FOLLOW UP: Yes  Activity Tolerance: Patient Tolerated treatment well         Plan : Patient will be seen 1-3 times per week for 4-6 weeks  Current Treatment Recommendations: Strengthening, ADL/Self-care Training, Patient/Caregiver Education & Training, ROM, Modalities, Neuromuscular Re-education, Endurance Training, Manual Therapy - Soft Tissue Mobilization, Home Exercise Program    OutComes Score  QuickDASH Total Score: 21 (11/11/20 1928)       QuickDASH Disability/Symptom Score : 22.73 % (11/11/20 1928)    Goals  Short term goals  Time Frame for Short term goals: 4-6 weeks  Short term goal 1: Pain </= 0/10 at rest, 2/10 with activity  Short term goal 2: Patient able to demonstrate AROM L shoulder WFL  Short term goal 3: Patient able to demonstrate strength L shoulder >/= 3+/5  Short term goal 4: Patient able to perform light chores without increased symptoms  Short term goal 5: Patient independent with written home exercise program  Patient Goals   Patient goals : \"Regain strength, motion and use of my arm\"       Therapy Time   Individual Concurrent Group Co-treatment   Time In 1455         Time Out 1540         Minutes 45         Timed Code Treatment Minutes: [address] [facility]

## 2020-11-12 NOTE — PLAN OF CARE
Outpatient Physical Therapy  [] [facility]    Phone: [phone]   Fax: [phone]   [] [facility]  Phone: [phone]              Fax: [phone]  [x] [facility]   Phone: [phone]   Fax: [phone]     To: Referring Practitioner: Ammon Crouch MD      Patient: Zonia Gutiérrez   : 1955   MRN: [id number]  Evaluation Date: 2020      Diagnosis Information:  · Diagnosis: s/p Left Rotator Cuff Repair Z98.890   · Treatment Diagnosis: s/p L Rotator Cuff Repair, L shoulder Pain, Decreased Functional Mobility     Physical Therapy Certification Form  Dear Dr. Dwight Keyes,  The following patient has been evaluated for physical therapy services and for therapy to continue, Medicare requires monthly physician review of the treatment plan. Please review the attached evaluation and/or summary of the patient's plan of care, and verify that you agree therapy should continue by signing the attached document and sending it back to our office. Plan of Care/Treatment to date:  [x] Therapeutic Exercise    [x] Modalities:  [x] Therapeutic Activity     [] Ultrasound  [] Electrical Stimulation  [] Gait Training      [] Cervical Traction [] Lumbar Traction  [x] Neuromuscular Re-education    [] Cold/hotpack [] Iontophoresis   [x] Instruction in HEP     Other:  [x] Manual Therapy      []             [] Aquatic Therapy      []           ? Frequency/Duration: 1-3 times per week for 4-6 weeks    Rehab Potential: [] Excellent [x] Good [] Fair  [] Poor       Electronically signed by:  Janel Saavedra, PT [id number]      If you have any questions or concerns, please don't hesitate to call.   Thank you for your referral.      Physician Signature:________________________________Date:__________________  By signing above, therapists plan is approved by physician

## 2020-11-12 NOTE — FLOWSHEET NOTE
Physical Therapy Daily Treatment Note  Date:  2020    Patient Name:  Shine He    :  1955  MRN: [id number]    Restrictions/Precautions:  Restrictions/Precautions  Restrictions/Precautions: Fall Risk(Low)  Position Activity Restriction  Other position/activity restrictions: Rotator Cuff Repair Protocol  Pertinent Medical History:      Medical/Treatment Diagnosis Information:  · Diagnosis: s/p Left Rotator Cuff Repair Z98.890  · Treatment Diagnosis: s/p L Rotator Cuff Repair, L shoulder Pain, Decreased Functional Mobility    Insurance/Certification information:  PT Insurance Information: [address] [place] [address], allowed 30 PT visits per year, no prior authorization required  Physician Information:  Referring Practitioner: Samra Ambrocio MD  Plan of care signed (Y/N): Cosign requested     Visit# / total visits:    Pain level: 2/10 at best, 4/10 at worst     Functional Outcomes Measure:  Test: QuickDASH  Score:21 = 22.73% Disability  (20)    Progress Note: [x]  Yes  []  No  Next due by: Visit #10      History of Injury:Patient reports a history of Left shoulder pain x 1 year. \"If I did anything physical with my arm it would hurt. I couldn't sleep some nights\"  Patient saw Dr. Melita Bocanegra, had x-ray, MRI, RTC tear, 2 bone spurs, arthritis in shoulder. Patient underwent arthroscopic RTC repair, Juno, chondroplasty, labral debridement at [facility] on 20. Patient reports  pain primarilly after block wore off, but has improved. Patient has used pain medication and/or ibuprofen prn, sparingly. Patient is not able to sleep, patient is using R UE for ADLs. Patient is limiting activities due to L shoulder. patient is off work at present.     Subjective:       Objective:   Observation:    Test measurements:      Exercises:  Exercise/Equipment Resistance/Repetitions Other comments                                                                            Other Therapeutic Activities: Manipulation / MLD  [] (66938) Provided manual therapy to mobilize  soft tissue/joints/fluid for the purpose of modulating pain, promoting relaxation, increasing ROM, reducing/eliminating soft tissue swelling/inflammation/restriction, improving soft tissue extensibility and allowing for proper ROM for normal function with self- care, mobility, lifting and ambulation. Timed Code Treatment Minutes: 25   Total Treatment Minutes: 45     [] EVAL (LOW) 95459   [x] EVAL (MOD) 40369   [] EVAL (HIGH) 22131   [] RE-EVAL   [x] TE (70667) x     [] Aquatic (99492) x  [] NMR (68754)   x  [] Aquatic Group (79032) x  [] Manual (28857) x    [] Ultrasound (07420) x  [x] TA (64334) x  [] Mech Traction (28488)  [] Ionto (89314)           [] ES (un) (90911):   [] Vasopump (98720) [] Other:      Assessment  [x] Patient tolerated treatment well [] Patient limited by fatigue  [] Patient limited by pain  [] Patient limited by other medical complications  [] Other:     Prognosis: [x] Good [] Fair  [] Poor    Goals:    Short term goals  Time Frame for Short term goals: 4-6 weeks  Short term goal 1: Pain </= 0/10 at rest, 2/10 with activity  Short term goal 2: Patient able to demonstrate AROM L shoulder WFL  Short term goal 3: Patient able to demonstrate strength L shoulder >/= 3+/5  Short term goal 4: Patient able to perform light chores without increased symptoms  Short term goal 5: Patient independent with written home exercise program            Patient Requires Follow-up: [x] Yes  [] No    Plan:   [] Continue per plan of care [] Alter current plan (see comments)  [x] Plan of care initiated [] Hold pending MD visit [] Discharge  Note: If patient does not return for scheduled/ recommended follow up visits, this note will serve as a discharge from care along with most recent update on progress. Plan for Next Session:  Monitor response. Initiate pulleys, soft tissue mob, PROM L shoulder.     Electronically signed by:  Babs Krishnan [id number] [facility]

## 2020-11-13 ENCOUNTER — HOSPITAL ENCOUNTER (OUTPATIENT)
Dept: PHYSICAL THERAPY | Age: 65
Setting detail: THERAPIES SERIES
Discharge: HOME | End: 2020-11-13
Payer: COMMERCIAL

## 2020-11-13 PROCEDURE — 97110 THERAPEUTIC EXERCISES: CPT

## 2020-11-13 PROCEDURE — 97140 MANUAL THERAPY 1/> REGIONS: CPT

## 2020-11-13 NOTE — FLOWSHEET NOTE
Physical Therapy Daily Treatment Note  Date:  2020    Patient Name:  Andres Snow    :  1955  MRN: [id number]    Restrictions/Precautions:  Restrictions/Precautions  Restrictions/Precautions: Fall Risk(Low)  Position Activity Restriction  Other position/activity restrictions: Rotator Cuff Repair Protocol  Pertinent Medical History:      Medical/Treatment Diagnosis Information:  · Diagnosis: s/p Left Rotator Cuff Repair Z98.890  · Treatment Diagnosis: s/p L Rotator Cuff Repair, L shoulder Pain, Decreased Functional Mobility    Insurance/Certification information:  PT Insurance Information: [address] [place] [address], allowed 30 PT visits per year, no prior authorization required  Physician Information:  Referring Practitioner: Kathleen Arriaga MD  Plan of care signed (Y/N): Cosign requested     Visit# / total visits:    Pain level: 2/10 at best, 4/10 at worst     Functional Outcomes Measure:  Test: QuickDASH  Score:21 = 22.73% Disability  (20)    Progress Note: [x]  Yes  []  No  Next due by: Visit #10      History of Injury:Patient reports a history of Left shoulder pain x 1 year. \"If I did anything physical with my arm it would hurt. I couldn't sleep some nights\"  Patient saw Dr. Sumanth Houser, had x-ray, MRI, RTC tear, 2 bone spurs, arthritis in shoulder. Patient underwent arthroscopic RTC repair, Juno, chondroplasty, labral debridement at [facility] on 20. Patient reports  pain primarilly after block wore off, but has improved. Patient has used pain medication and/or ibuprofen prn, sparingly. Patient is not able to sleep, patient is using R UE for ADLs. Patient is limiting activities due to L shoulder. patient is off work at present. Subjective: Woke up really sore. Adriel Lente asleep last night and woke up in the same position. Still pretty sore.     Objective:   Observation:    Test measurements:      Exercises:  Exercise/Equipment Resistance/Repetitions Other comments posture, motor skill, proprioception for self-care, mobility, lifting, and ambulation      Manual Treatments:  MFR / STM / Oscillations-Mobs:  G-I, II, III, IV / Manipulation / MLD  [] (47729) Provided manual therapy to mobilize  soft tissue/joints/fluid for the purpose of modulating pain, promoting relaxation, increasing ROM, reducing/eliminating soft tissue swelling/inflammation/restriction, improving soft tissue extensibility and allowing for proper ROM for normal function with self- care, mobility, lifting and ambulation.         Timed Code Treatment Minutes: 40   Total Treatment Minutes: 45     [] EVAL (LOW) 10104   [] EVAL (MOD) 15905   [] EVAL (HIGH) 06736   [] RE-EVAL   [x] TE (05657) x     [] Aquatic (82723) x  [] NMR (35300)   x  [] Aquatic Group (55498) x  [x] Manual (87056) x  2  [] Ultrasound (20120) x  [] TA (31643) x  [] Mech Traction (66989)  [] Ionto (50566)           [] ES (un) (76815):   [] Vasopump (16105) [] Other:      Assessment  [x] Patient tolerated treatment well [] Patient limited by fatigue  [] Patient limited by pain  [] Patient limited by other medical complications  [] Other:     Prognosis: [x] Good [] Fair  [] Poor    Goals:    Short term goals  Time Frame for Short term goals: 4-6 weeks  Short term goal 1: Pain </= 0/10 at rest, 2/10 with activity  Short term goal 2: Patient able to demonstrate AROM L shoulder WFL  Short term goal 3: Patient able to demonstrate strength L shoulder >/= 3+/5  Short term goal 4: Patient able to perform light chores without increased symptoms  Short term goal 5: Patient independent with written home exercise program            Patient Requires Follow-up: [x] Yes  [] No    Plan:   [x] Continue per plan of care [] Alter current plan (see comments)  [] Plan of care initiated [] Hold pending MD visit [] Discharge  Note: If patient does not return for scheduled/ recommended follow up visits, this note will serve as a discharge from care along with most recent update on progress. Plan for Next Session:  Monitor response. Soft tissue mob, PROM L shoulder.     Electronically signed by:  Manasa Gil PTA

## 2020-11-18 ENCOUNTER — HOSPITAL ENCOUNTER (OUTPATIENT)
Dept: PHYSICAL THERAPY | Age: 65
Setting detail: THERAPIES SERIES
Discharge: HOME | End: 2020-11-18
Payer: COMMERCIAL

## 2020-11-18 PROCEDURE — 97110 THERAPEUTIC EXERCISES: CPT

## 2020-11-18 PROCEDURE — 97140 MANUAL THERAPY 1/> REGIONS: CPT

## 2020-11-18 NOTE — FLOWSHEET NOTE
Physical Therapy Daily Treatment Note  Date:  2020    Patient Name:  Celso Tracy    :  1955  MRN: [id number]    Restrictions/Precautions:  Restrictions/Precautions  Restrictions/Precautions: Fall Risk(Low)  Position Activity Restriction  Other position/activity restrictions: Rotator Cuff Repair Protocol  Pertinent Medical History:      Medical/Treatment Diagnosis Information:  · Diagnosis: s/p Left Rotator Cuff Repair Z98.890  · Treatment Diagnosis: s/p L Rotator Cuff Repair, L shoulder Pain, Decreased Functional Mobility    Insurance/Certification information:  PT Insurance Information: [address] [place] [address], allowed 30 PT visits per year, no prior authorization required  Physician Information:  Referring Practitioner: Adelia Gaines MD  Plan of care signed (Y/N): Cosign requested     Visit# / total visits:  3/12  Pain level: 2/10 at best, 4/10 at worst     Functional Outcomes Measure:  Test: QuickDASH  Score:21 = 22.73% Disability  (20)    Progress Note: [x]  Yes  []  No  Next due by: Visit #10      History of Injury:Patient reports a history of Left shoulder pain x 1 year. \"If I did anything physical with my arm it would hurt. I couldn't sleep some nights\"  Patient saw Dr. Mehran Andersen, had x-ray, MRI, RTC tear, 2 bone spurs, arthritis in shoulder. Patient underwent arthroscopic RTC repair, Juno, chondroplasty, labral debridement at [facility] on 20. Patient reports  pain primarilly after block wore off, but has improved. Patient has used pain medication and/or ibuprofen prn, sparingly. Patient is not able to sleep, patient is using R UE for ADLs. Patient is limiting activities due to L shoulder. patient is off work at present. Subjective: Woke up really sore. Adelaida Half asleep last night and woke up in the same position. Still pretty sore. 20: Tolerated last session fine.  The hardest thing is sleeping - can't get comfortable at night    Objective:   Observation:  Test measurements:      Exercises:  Exercise/Equipment Resistance/Repetitions Other comments        Pulley flexion 10x                                                                   Other Therapeutic Activities:  11/11/20:  Discussed at length anatomy and biomechanics of the shoulder, muscle reeducation, motor recruitment. Home Exercise Program:  11/11/20:   Patient instructed in lower trap sets, Codman's pendulum, elbow flex/ext, pron/sup, wrist flex/ext, fist ; written instructions with pictures issued, patient able to demonstrate exercises. Manual Treatments:  Soft tissue mobilization; passive stretching    Modalities:     Progression Towards Functional goals:  [] Patient is progressing as expected towards functional goals listed. [] Progression is slowed due to complexities listed. [] Progression has been slowed due to co-morbidities. [x] Plan just implemented, too soon to assess goals progression  [] Other:    Charges: Therapeutic Exercise:  [x] (69570) Provided verbal/tactile cueing for activities to restore or maintain strength, flexibility, endurance, ROM for improvements with self-care, mobility, lifting and ambulation. Neuromuscular Re-Education  [] (44180) Provided verbal/tactile cueing for activities to restore or maintain balance, coordination, kinesthetic sense, posture, motor skill, proprioception for self-care, mobility, lifting, and ambulation. Therapeutic Activities:    [x] (82734) Provided verbal/tactile cueing to address functional limitations related to loss of mobility, strength, balance, and coordination.      Gait Training:  [] (63758) Provided training and instruction to the patient for proper postural muscle recruitment and positioning with ambulation re-education     Home Exercise Program:    [x] (87858) Reviewed/Progressed HEP activities related to strengthening, flexibility, endurance, ROM for functional self-care, mobility, lifting and ambulation   [] (32437)

## 2020-11-20 ENCOUNTER — HOSPITAL ENCOUNTER (OUTPATIENT)
Dept: PHYSICAL THERAPY | Age: 65
Setting detail: THERAPIES SERIES
Discharge: HOME | End: 2020-11-20
Payer: COMMERCIAL

## 2020-11-20 PROCEDURE — 97140 MANUAL THERAPY 1/> REGIONS: CPT

## 2020-11-20 PROCEDURE — 97110 THERAPEUTIC EXERCISES: CPT

## 2020-11-20 NOTE — FLOWSHEET NOTE
Physical Therapy Daily Treatment Note  Date:  2020    Patient Name:  Zonia Gutiérrez    :  1955  MRN: [id number]    Restrictions/Precautions:  Restrictions/Precautions  Restrictions/Precautions: Fall Risk(Low)  Position Activity Restriction  Other position/activity restrictions: Rotator Cuff Repair Protocol  Pertinent Medical History:      Medical/Treatment Diagnosis Information:  · Diagnosis: s/p Left Rotator Cuff Repair Z98.890  · Treatment Diagnosis: s/p L Rotator Cuff Repair, L shoulder Pain, Decreased Functional Mobility    Insurance/Certification information:  PT Insurance Information: [address] [place] [address], allowed 30 PT visits per year, no prior authorization required  Physician Information:  Referring Practitioner: Ammon Crouch MD  Plan of care signed (Y/N): Cosign requested     Visit# / total visits:    Pain level: 2/10 at best, 4/10 at worst     Functional Outcomes Measure:  Test: QuickDASH  Score:21 = 22.73% Disability  (20)    Progress Note: [x]  Yes  []  No  Next due by: Visit #10      History of Injury:Patient reports a history of Left shoulder pain x 1 year. \"If I did anything physical with my arm it would hurt. I couldn't sleep some nights\"  Patient saw Dr. Dwight Keyes, had x-ray, MRI, RTC tear, 2 bone spurs, arthritis in shoulder. Patient underwent arthroscopic RTC repair, Juno, chondroplasty, labral debridement at [facility] on 20. Patient reports  pain primarilly after block wore off, but has improved. Patient has used pain medication and/or ibuprofen prn, sparingly. Patient is not able to sleep, patient is using R UE for ADLs. Patient is limiting activities due to L shoulder. patient is off work at present. Subjective: Woke up really sore. Sesar Garneras asleep last night and woke up in the same position. Still pretty sore. 20: Tolerated last session fine.  The hardest thing is sleeping - can't get comfortable at night  20:  Was sore after last session, but never more than a 1-2/10. Sharp pains from wrong movements don't last more than 30 seconds  Objective:   Observation:    Test measurements:      Exercises:  Exercise/Equipment Resistance/Repetitions Other comments        Pulley flexion 20x                                                                   Other Therapeutic Activities:  11/11/20:  Discussed at length anatomy and biomechanics of the shoulder, muscle reeducation, motor recruitment. Home Exercise Program:  11/11/20:   Patient instructed in lower trap sets, Codman's pendulum, elbow flex/ext, pron/sup, wrist flex/ext, fist ; written instructions with pictures issued, patient able to demonstrate exercises. Manual Treatments:  Soft tissue mobilization; passive stretching    Modalities:     Progression Towards Functional goals:  [] Patient is progressing as expected towards functional goals listed. [] Progression is slowed due to complexities listed. [] Progression has been slowed due to co-morbidities. [x] Plan just implemented, too soon to assess goals progression  [] Other:    Charges: Therapeutic Exercise:  [x] (76882) Provided verbal/tactile cueing for activities to restore or maintain strength, flexibility, endurance, ROM for improvements with self-care, mobility, lifting and ambulation. Neuromuscular Re-Education  [] (15681) Provided verbal/tactile cueing for activities to restore or maintain balance, coordination, kinesthetic sense, posture, motor skill, proprioception for self-care, mobility, lifting, and ambulation. Therapeutic Activities:    [x] (16053) Provided verbal/tactile cueing to address functional limitations related to loss of mobility, strength, balance, and coordination.      Gait Training:  [] (94513) Provided training and instruction to the patient for proper postural muscle recruitment and positioning with ambulation re-education     Home Exercise Program:    [x] (55345) Reviewed/Progressed HEP activities related to strengthening, flexibility, endurance, ROM for functional self-care, mobility, lifting and ambulation   [] (62965) Reviewed/Progressed HEP activities related to improving balance, coordination, kinesthetic sense, posture, motor skill, proprioception for self-care, mobility, lifting, and ambulation      Manual Treatments:  MFR / STM / Oscillations-Mobs:  G-I, II, III, IV / Manipulation / MLD  [] (24613) Provided manual therapy to mobilize  soft tissue/joints/fluid for the purpose of modulating pain, promoting relaxation, increasing ROM, reducing/eliminating soft tissue swelling/inflammation/restriction, improving soft tissue extensibility and allowing for proper ROM for normal function with self- care, mobility, lifting and ambulation.         Timed Code Treatment Minutes: 40   Total Treatment Minutes: 45     [] EVAL (LOW) 56081   [] EVAL (MOD) 64227   [] EVAL (HIGH) 18023   [] RE-EVAL   [x] TE (20258) x     [] Aquatic (37020) x  [] NMR (99745)   x  [] Aquatic Group (86759) x  [x] Manual (06556) x  2  [] Ultrasound (56568) x  [] TA (12699) x  [] Mech Traction (59040)  [] Ionto (26877)           [] ES (un) (35959):   [] Vasopump (14475) [] Other:      Assessment  [x] Patient tolerated treatment well [] Patient limited by fatigue  [] Patient limited by pain  [] Patient limited by other medical complications  [] Other:     Prognosis: [x] Good [] Fair  [] Poor    Goals:    Short term goals  Time Frame for Short term goals: 4-6 weeks  Short term goal 1: Pain </= 0/10 at rest, 2/10 with activity  Short term goal 2: Patient able to demonstrate AROM L shoulder WFL  Short term goal 3: Patient able to demonstrate strength L shoulder >/= 3+/5  Short term goal 4: Patient able to perform light chores without increased symptoms  Short term goal 5: Patient independent with written home exercise program            Patient Requires Follow-up: [x] Yes  [] No    Plan:   [x] Continue per plan of care [] Alter current plan (see comments)  [] Plan of care initiated [] Hold pending MD visit [] Discharge  Note: If patient does not return for scheduled/ recommended follow up visits, this note will serve as a discharge from care along with most recent update on progress. Plan for Next Session:  Monitor response. Soft tissue mob, PROM L shoulder.     Electronically signed by:  Bradley Graham PTA

## 2020-11-23 ENCOUNTER — HOSPITAL ENCOUNTER (OUTPATIENT)
Dept: PHYSICAL THERAPY | Age: 65
Setting detail: THERAPIES SERIES
Discharge: HOME | End: 2020-11-23
Payer: COMMERCIAL

## 2020-11-23 PROCEDURE — 97140 MANUAL THERAPY 1/> REGIONS: CPT

## 2020-11-23 PROCEDURE — 97110 THERAPEUTIC EXERCISES: CPT

## 2020-11-23 NOTE — FLOWSHEET NOTE
Physical Therapy Daily Treatment Note  Date:  2020    Patient Name:  oZra Doan    :  1955  MRN: [id number]    Restrictions/Precautions:  Restrictions/Precautions  Restrictions/Precautions: Fall Risk(Low)  Position Activity Restriction  Other position/activity restrictions: Rotator Cuff Repair Protocol  Sling:  Medium tear:  4 wks at all times, then 2 wks for activities  DOS:  20         Motion:  POD 1 (20):  Pendulum exercises, elbow/wrist/hand ROM  POD 14 (20): Supine PROM:  Flex to 130, abd to 40, ER to tolerance w/45 degree abd Start stationary bike  POD 21 (20):  P/AAROM:  Flex to 150, abd to 70, ER to tolerance with 45 degree abd  POD 42 (20):  P/AAROM:  Flex to 180, abd and ER to tolerance                  Begin active ROM, progress to full AROM as comfort allows    Strengthenin wk (Only small/medium tears): Gentle strengthening, isometrics, pain-free  10 wks:  Initiate full strengthening program; theraband ER/IR, side-lying ER;                Deltoid lateral raise; Elyssa-scapular strengthening; ;Progressive resistance  Pertinent Medical History:      Medical/Treatment Diagnosis Information:  · Diagnosis: s/p Left Rotator Cuff Repair Z98.890  · Treatment Diagnosis: s/p L Rotator Cuff Repair, L shoulder Pain, Decreased Functional Mobility    Insurance/Certification information:  PT Insurance Information: [address] [place] [address], allowed 30 PT visits per year, no prior authorization required  Physician Information:  Referring Practitioner: Parmjit Paul MD  Plan of care signed (Y/N): Cosign requested     Visit# / total visits:    Pain level: 2/10 at best, 3/10 at worst     Functional Outcomes Measure:  Test: QuickDASH  Score:21 = 22.73% Disability  (20)    Progress Note: [x]  Yes  []  No  Next due by: Visit #10      History of Injury:Patient reports a history of Left shoulder pain x 1 year. \"If I did anything physical with my arm it would hurt. progressing as expected towards functional goals listed. [] Progression is slowed due to complexities listed. [] Progression has been slowed due to co-morbidities. [] Plan just implemented, too soon to assess goals progression  [] Other:    Charges: Therapeutic Exercise:  [x] (00043) Provided verbal/tactile cueing for activities to restore or maintain strength, flexibility, endurance, ROM for improvements with self-care, mobility, lifting and ambulation. Neuromuscular Re-Education  [] (88859) Provided verbal/tactile cueing for activities to restore or maintain balance, coordination, kinesthetic sense, posture, motor skill, proprioception for self-care, mobility, lifting, and ambulation. Therapeutic Activities:    [] (98234) Provided verbal/tactile cueing to address functional limitations related to loss of mobility, strength, balance, and coordination. Gait Training:  [] (03203) Provided training and instruction to the patient for proper postural muscle recruitment and positioning with ambulation re-education     Home Exercise Program:    [x] (50992) Reviewed/Progressed HEP activities related to strengthening, flexibility, endurance, ROM for functional self-care, mobility, lifting and ambulation   [] (52281) Reviewed/Progressed HEP activities related to improving balance, coordination, kinesthetic sense, posture, motor skill, proprioception for self-care, mobility, lifting, and ambulation      Manual Treatments:  MFR / STM / Oscillations-Mobs:  G-I, II, III, IV / Manipulation / MLD  [x] (22376) Provided manual therapy to mobilize  soft tissue/joints/fluid for the purpose of modulating pain, promoting relaxation, increasing ROM, reducing/eliminating soft tissue swelling/inflammation/restriction, improving soft tissue extensibility and allowing for proper ROM for normal function with self- care, mobility, lifting and ambulation.         Timed Code Treatment Minutes: 45   Total Treatment Minutes: 45 [] EVAL (LOW) 50421   [] EVAL (MOD) 70617   [] EVAL (HIGH) 73036   [] RE-EVAL   [x] TE (76231) x     [] Aquatic (67604) x  [] NMR (20337)   x  [] Aquatic Group (71097) x  [x] Manual (82791) x  2  [] Ultrasound (84582) x  [] TA (70050) x  [] Mech Traction (98896)  [] Ionto (11173)           [] ES (un) (99977):   [] Vasopump (55368) [] Other:      Assessment  [x] Patient tolerated treatment well [] Patient limited by fatigue  [] Patient limited by pain  [] Patient limited by other medical complications  [] Other:     Prognosis: [x] Good [] Fair  [] Poor    Goals:    Short term goals  Time Frame for Short term goals: 4-6 weeks  Short term goal 1: Pain </= 0/10 at rest, 2/10 with activity  Short term goal 2: Patient able to demonstrate AROM L shoulder WFL  Short term goal 3: Patient able to demonstrate strength L shoulder >/= 3+/5  Short term goal 4: Patient able to perform light chores without increased symptoms  Short term goal 5: Patient independent with written home exercise program            Patient Requires Follow-up: [x] Yes  [] No    Plan:   [x] Continue per plan of care [] Alter current plan (see comments)  [] Plan of care initiated [] Hold pending MD visit [] Discharge  Note: If patient does not return for scheduled/ recommended follow up visits, this note will serve as a discharge from care along with most recent update on progress. Plan for Next Session:  Monitor response. Soft tissue mob, PROM L shoulder. Instruct patient in isometric shoulder flex/ext if able for HEP.     Electronically signed by:  Esperanza Gonzales, [address] [place] [address],[facility]

## 2020-11-25 ENCOUNTER — HOSPITAL ENCOUNTER (OUTPATIENT)
Dept: PHYSICAL THERAPY | Age: 65
Setting detail: THERAPIES SERIES
Discharge: HOME | End: 2020-11-25
Payer: COMMERCIAL

## 2020-11-25 PROCEDURE — 97110 THERAPEUTIC EXERCISES: CPT

## 2020-11-25 PROCEDURE — 97140 MANUAL THERAPY 1/> REGIONS: CPT

## 2020-11-25 NOTE — FLOWSHEET NOTE
Physical Therapy Daily Treatment Note  Date:  2020    Patient Name:  Zora Doan    :  1955  MRN: [id number]    Restrictions/Precautions:  Restrictions/Precautions  Restrictions/Precautions: Fall Risk(Low)  Position Activity Restriction  Other position/activity restrictions: Rotator Cuff Repair Protocol  Sling:  Medium tear:  4 wks at all times, then 2 wks for activities  DOS:  20         Motion:  POD 1 (20):  Pendulum exercises, elbow/wrist/hand ROM  POD 14 (20): Supine PROM:  Flex to 130, abd to 40, ER to tolerance w/45 degree abd Start stationary bike  POD 21 (20):  P/AAROM:  Flex to 150, abd to 70, ER to tolerance with 45 degree abd  POD 42 (20):  P/AAROM:  Flex to 180, abd and ER to tolerance                  Begin active ROM, progress to full AROM as comfort allows    Strengthenin wk (Only small/medium tears): Gentle strengthening, isometrics, pain-free  10 wks:  Initiate full strengthening program; theraband ER/IR, side-lying ER;                Deltoid lateral raise; Elyssa-scapular strengthening; ;Progressive resistance  Pertinent Medical History:      Medical/Treatment Diagnosis Information:  · Diagnosis: s/p Left Rotator Cuff Repair Z98.890  · Treatment Diagnosis: s/p L Rotator Cuff Repair, L shoulder Pain, Decreased Functional Mobility    Insurance/Certification information:  PT Insurance Information: [address] [place] [address], allowed 30 PT visits per year, no prior authorization required  Physician Information:  Referring Practitioner: Parmjit Paul MD  Plan of care signed (Y/N): Cosign requested     Visit# / total visits:    Pain level: 0/10 at best, 2-3/10 at worst     Functional Outcomes Measure:  Test: QuickDASH  Score:21 = 22.73% Disability  (20)    Progress Note: [x]  Yes  []  No  Next due by: Visit #10      History of Injury:Patient reports a history of Left shoulder pain x 1 year.   \"If I did anything physical with my arm it would hurt.  I couldn't sleep some nights\"  Patient saw Dr. Ozella Lombard, had x-ray, MRI, RTC tear, 2 bone spurs, arthritis in shoulder. Patient underwent arthroscopic RTC repair, Juno, chondroplasty, labral debridement at [facility] on 11/4/20. Patient reports  pain primarilly after block wore off, but has improved. Patient has used pain medication and/or ibuprofen prn, sparingly. Patient is not able to sleep, patient is using R UE for ADLs. Patient is limiting activities due to L shoulder. patient is off work at present. Subjective: Woke up really sore. Caro Bragg asleep last night and woke up in the same position. Still pretty sore. 11/18/20: Tolerated last session fine. The hardest thing is sleeping - can't get comfortable at night  11/20/20:  Was sore after last session, but never more than a 1-2/10. Sharp pains from wrong movements don't last more than 30 seconds  11/23/20:  Patient reports shoulder is doing well, \"I only have a little achiness after therapy,then it goes away\"  Objective:   Observation:    Test measurements:      Exercises:  Exercise/Equipment Resistance/Repetitions Other comments        Pulley flexion 20x sitting   Gym Ball on Table Green, 1 x 15 reps, flexion  Green, 1 x 15, scaption standing   UE Lee Flexion, 1 x 10 reps standing                                                        Other Therapeutic Activities:  11/11/20:  Discussed at length anatomy and biomechanics of the shoulder, muscle reeducation, motor recruitment. Home Exercise Program:  11/11/20:   Patient instructed in lower trap sets, Codman's pendulum, elbow flex/ext, pron/sup, wrist flex/ext, fist ; written instructions with pictures issued, patient able to demonstrate exercises. 11/23/20:   Patient instructed in flexion and scaption table slides written instructions with pictures issued, patient able to demonstrate exercises.   11/25/20:   Patient instructed in isometric shoulder flex, ext ; written instructions with pictures issued, patient able to demonstrate exercises. Manual Treatments:  Soft tissue mobilization; passive stretching to 110 flex, 100 scaption, 30 ER    Modalities:     Progression Towards Functional goals:  [x] Patient is progressing as expected towards functional goals listed. [] Progression is slowed due to complexities listed. [] Progression has been slowed due to co-morbidities. [] Plan just implemented, too soon to assess goals progression  [] Other:    Charges: Therapeutic Exercise:  [x] (18731) Provided verbal/tactile cueing for activities to restore or maintain strength, flexibility, endurance, ROM for improvements with self-care, mobility, lifting and ambulation. Neuromuscular Re-Education  [] (79418) Provided verbal/tactile cueing for activities to restore or maintain balance, coordination, kinesthetic sense, posture, motor skill, proprioception for self-care, mobility, lifting, and ambulation. Therapeutic Activities:    [] (55023) Provided verbal/tactile cueing to address functional limitations related to loss of mobility, strength, balance, and coordination.      Gait Training:  [] (58233) Provided training and instruction to the patient for proper postural muscle recruitment and positioning with ambulation re-education     Home Exercise Program:    [x] (87404) Reviewed/Progressed HEP activities related to strengthening, flexibility, endurance, ROM for functional self-care, mobility, lifting and ambulation   [] (75127) Reviewed/Progressed HEP activities related to improving balance, coordination, kinesthetic sense, posture, motor skill, proprioception for self-care, mobility, lifting, and ambulation      Manual Treatments:  MFR / STM / Oscillations-Mobs:  G-I, II, III, IV / Manipulation / MLD  [x] (48216) Provided manual therapy to mobilize  soft tissue/joints/fluid for the purpose of modulating pain, promoting relaxation, increasing ROM, reducing/eliminating soft tissue swelling/inflammation/restriction, improving soft tissue extensibility and allowing for proper ROM for normal function with self- care, mobility, lifting and ambulation. Timed Code Treatment Minutes: 45   Total Treatment Minutes: 45     [] EVAL (LOW) 34770   [] EVAL (MOD) 97807   [] EVAL (HIGH) 91409   [] RE-EVAL   [x] TE (82327) x     [] Aquatic (99872) x  [] NMR (77481)   x  [] Aquatic Group (62302) x  [x] Manual (59695) x  2  [] Ultrasound (04407) x  [] TA (02996) x  [] Mech Traction (42338)  [] Ionto (33992)           [] ES (un) (09747):   [] Vasopump (13591) [] Other:      Assessment  [x] Patient tolerated treatment well [] Patient limited by fatigue  [] Patient limited by pain  [] Patient limited by other medical complications  [] Other:     Prognosis: [x] Good [] Fair  [] Poor    Goals:    Short term goals  Time Frame for Short term goals: 4-6 weeks  Short term goal 1: Pain </= 0/10 at rest, 2/10 with activity  Short term goal 2: Patient able to demonstrate AROM L shoulder WFL  Short term goal 3: Patient able to demonstrate strength L shoulder >/= 3+/5  Short term goal 4: Patient able to perform light chores without increased symptoms  Short term goal 5: Patient independent with written home exercise program            Patient Requires Follow-up: [x] Yes  [] No    Plan:   [x] Continue per plan of care [] Alter current plan (see comments)  [] Plan of care initiated [] Hold pending MD visit [] Discharge  Note: If patient does not return for scheduled/ recommended follow up visits, this note will serve as a discharge from care along with most recent update on progress. Plan for Next Session:  Monitor response. Soft tissue mob, PROM L shoulder. Instruct patient in isometric shoulder abd/addt if able for HEP.     Electronically signed by:  Vicente Patel, [address] [facility]

## 2020-11-30 ENCOUNTER — HOSPITAL ENCOUNTER (OUTPATIENT)
Dept: PHYSICAL THERAPY | Age: 65
Setting detail: THERAPIES SERIES
Discharge: HOME | End: 2020-11-30
Payer: COMMERCIAL

## 2020-11-30 PROCEDURE — 97140 MANUAL THERAPY 1/> REGIONS: CPT

## 2020-11-30 PROCEDURE — 97110 THERAPEUTIC EXERCISES: CPT

## 2020-11-30 NOTE — FLOWSHEET NOTE
Physical Therapy Daily Treatment Note  Date:  2020    Patient Name:  Germain Butler    :  1955  MRN: [id number]    Restrictions/Precautions:  Restrictions/Precautions  Restrictions/Precautions: Fall Risk(Low)  Position Activity Restriction  Other position/activity restrictions: Rotator Cuff Repair Protocol  Sling:  Medium tear:  4 wks at all times, then 2 wks for activities  DOS:  20         Motion:  POD 1 (20):  Pendulum exercises, elbow/wrist/hand ROM  POD 14 (20): Supine PROM:  Flex to 130, abd to 40, ER to tolerance w/45 degree abd Start stationary bike  POD 21 (20):  P/AAROM:  Flex to 150, abd to 70, ER to tolerance with 45 degree abd  POD 42 (20):  P/AAROM:  Flex to 180, abd and ER to tolerance                  Begin active ROM, progress to full AROM as comfort allows    Strengthenin wk (Only small/medium tears): Gentle strengthening, isometrics, pain-free  10 wks:  Initiate full strengthening program; theraband ER/IR, side-lying ER;                Deltoid lateral raise; Elyssa-scapular strengthening; ;Progressive resistance  Pertinent Medical History:      Medical/Treatment Diagnosis Information:  · Diagnosis: s/p Left Rotator Cuff Repair Z98.890  · Treatment Diagnosis: s/p L Rotator Cuff Repair, L shoulder Pain, Decreased Functional Mobility    Insurance/Certification information:  PT Insurance Information: [address] [place] [address], allowed 30 PT visits per year, no prior authorization required  Physician Information:  Referring Practitioner: Bibiana Mondragon MD  Plan of care signed (Y/N): Cosign requested     Visit# / total visits:    Pain level: 0/10 at best, 2-3/10 at worst     Functional Outcomes Measure:  Test: QuickDASH  Score:21 = 22.73% Disability  (20)    Progress Note: [x]  Yes  []  No  Next due by: Visit #10      History of Injury:Patient reports a history of Left shoulder pain x 1 year.   \"If I did anything physical with my arm it would issued, patient able to demonstrate exercises. 11/25/20:   Patient instructed in isometric shoulder flex, ext ; written instructions with pictures issued, patient able to demonstrate exercises. 11/30/20: isometric shoulder abd and add  Manual Treatments:  Soft tissue mobilization; passive stretching to 110 flex, 100 scaption, 30 ER    Modalities:     Progression Towards Functional goals:  [x] Patient is progressing as expected towards functional goals listed. [] Progression is slowed due to complexities listed. [] Progression has been slowed due to co-morbidities. [] Plan just implemented, too soon to assess goals progression  [] Other:    Charges: Therapeutic Exercise:  [x] (09520) Provided verbal/tactile cueing for activities to restore or maintain strength, flexibility, endurance, ROM for improvements with self-care, mobility, lifting and ambulation. Neuromuscular Re-Education  [] (19373) Provided verbal/tactile cueing for activities to restore or maintain balance, coordination, kinesthetic sense, posture, motor skill, proprioception for self-care, mobility, lifting, and ambulation. Therapeutic Activities:    [] (32267) Provided verbal/tactile cueing to address functional limitations related to loss of mobility, strength, balance, and coordination.      Gait Training:  [] (58647) Provided training and instruction to the patient for proper postural muscle recruitment and positioning with ambulation re-education     Home Exercise Program:    [x] (79483) Reviewed/Progressed HEP activities related to strengthening, flexibility, endurance, ROM for functional self-care, mobility, lifting and ambulation   [] (40318) Reviewed/Progressed HEP activities related to improving balance, coordination, kinesthetic sense, posture, motor skill, proprioception for self-care, mobility, lifting, and ambulation      Manual Treatments:  MFR / STM / Oscillations-Mobs:  G-I, II, III, IV / Manipulation / MLD  [x] (59124) Provided manual therapy to mobilize  soft tissue/joints/fluid for the purpose of modulating pain, promoting relaxation, increasing ROM, reducing/eliminating soft tissue swelling/inflammation/restriction, improving soft tissue extensibility and allowing for proper ROM for normal function with self- care, mobility, lifting and ambulation. Timed Code Treatment Minutes: 45   Total Treatment Minutes: 45     [] EVAL (LOW) 65580   [] EVAL (MOD) 38832   [] EVAL (HIGH) 45021   [] RE-EVAL   [x] TE (97595) x     [] Aquatic (66405) x  [] NMR (97884)   x  [] Aquatic Group (66043) x  [x] Manual (70222) x  2  [] Ultrasound (68563) x  [] TA (85828) x  [] Mech Traction (86851)  [] Ionto (58827)           [] ES (un) (37020):   [] Vasopump (67226) [] Other:      Assessment  [x] Patient tolerated treatment well [] Patient limited by fatigue  [] Patient limited by pain  [] Patient limited by other medical complications  [] Other:     Prognosis: [x] Good [] Fair  [] Poor    Goals:    Short term goals  Time Frame for Short term goals: 4-6 weeks  Short term goal 1: Pain </= 0/10 at rest, 2/10 with activity  Short term goal 2: Patient able to demonstrate AROM L shoulder WFL  Short term goal 3: Patient able to demonstrate strength L shoulder >/= 3+/5  Short term goal 4: Patient able to perform light chores without increased symptoms  Short term goal 5: Patient independent with written home exercise program            Patient Requires Follow-up: [x] Yes  [] No    Plan:   [x] Continue per plan of care [] Alter current plan (see comments)  [] Plan of care initiated [] Hold pending MD visit [] Discharge  Note: If patient does not return for scheduled/ recommended follow up visits, this note will serve as a discharge from care along with most recent update on progress. Plan for Next Session:  Monitor response. Soft tissue mob, PROM L shoulder.     Electronically signed by:  Jennifer Estrella PTA

## 2020-12-03 ENCOUNTER — HOSPITAL ENCOUNTER (OUTPATIENT)
Dept: PHYSICAL THERAPY | Age: 65
Setting detail: THERAPIES SERIES
Discharge: HOME | End: 2020-12-03
Payer: COMMERCIAL

## 2020-12-03 PROCEDURE — 97140 MANUAL THERAPY 1/> REGIONS: CPT

## 2020-12-03 PROCEDURE — 97110 THERAPEUTIC EXERCISES: CPT

## 2020-12-03 NOTE — FLOWSHEET NOTE
Physical Therapy Daily Treatment Note  Date:  12/3/2020    Patient Name:  Andres Snow    :  1955  MRN: [id number]    Restrictions/Precautions:  Restrictions/Precautions  Restrictions/Precautions: Fall Risk(Low)  Position Activity Restriction  Other position/activity restrictions: Rotator Cuff Repair Protocol  Sling:  Medium tear:  4 wks at all times, then 2 wks for activities  DOS:  20         Motion:  POD 1 (20):  Pendulum exercises, elbow/wrist/hand ROM  POD 14 (20): Supine PROM:  Flex to 130, abd to 40, ER to tolerance w/45 degree abd Start stationary bike  POD 21 (20):  P/AAROM:  Flex to 150, abd to 70, ER to tolerance with 45 degree abd  POD 42 (20):  P/AAROM:  Flex to 180, abd and ER to tolerance                  Begin active ROM, progress to full AROM as comfort allows    Strengthenin wk (Only small/medium tears): Gentle strengthening, isometrics, pain-free  10 wks:  Initiate full strengthening program; theraband ER/IR, side-lying ER;                Deltoid lateral raise; Elyssa-scapular strengthening; ;Progressive resistance  Pertinent Medical History:      Medical/Treatment Diagnosis Information:  · Diagnosis: s/p Left Rotator Cuff Repair Z98.890  · Treatment Diagnosis: s/p L Rotator Cuff Repair, L shoulder Pain, Decreased Functional Mobility    Insurance/Certification information:  PT Insurance Information: [address] [place] [address], allowed 30 PT visits per year, no prior authorization required  Physician Information:  Referring Practitioner: Kathleen Arriaag MD  Plan of care signed (Y/N): Cosign requested     Visit# / total visits:    Pain level: 0/10 at best, 2-3/10 at worst     Functional Outcomes Measure:  Test: QuickDASH  Score:21 = 22.73% Disability  (20)    Progress Note: [x]  Yes  []  No  Next due by: Visit #10      History of Injury:Patient reports a history of Left shoulder pain x 1 year. \"If I did anything physical with my arm it would hurt. I couldn't sleep some nights\"  Patient saw Dr. 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 Patient underwent arthroscopic RTC repair, Juno, chondroplasty, labral debridement at [facility] on 11/4/20. Patient reports  pain primarilly after block wore off, but has improved. Patient has used pain medication and/or ibuprofen prn, sparingly. Patient is not able to sleep, patient is using R UE for ADLs. Patient is limiting activities due to L shoulder. patient is off work at present. Subjective: Woke up really sore. Elza Schimke asleep last night and woke up in the same position. Still pretty sore. 11/18/20: Tolerated last session fine. The hardest thing is sleeping - can't get comfortable at night  11/20/20:  Was sore after last session, but never more than a 1-2/10. Sharp pains from wrong movements don't last more than 30 seconds  11/23/20:  Patient reports shoulder is doing well, \"I only have a little achiness after therapy,then it goes away\"  11/30/20: Elza Schimke hard on Saturday. Had sling on and did not injure his shoulder. Hit his head. Arm just gets stiff being in sling all the time  12/3/20:  Patient reports shoulder is doing pretty good, has soreness in elbow  Objective:   Observation:    Test measurements:      Exercises:  Exercise/Equipment Resistance/Repetitions Other comments        Pulley flexion 20x sitting   Gym Ball on Table Green, 1 x 15 reps, flexion  Green, 1 x 15, scaption standing   UE [employer] Flexion, 1 x 10 reps standing        [employer], 1 x 10 reps, yellow standing                                              Other Therapeutic Activities:  11/11/20:  Discussed at length anatomy and biomechanics of the shoulder, muscle reeducation, motor recruitment.     Home Exercise Program:  11/11/20:   Patient instructed in lower trap sets, Codman's pendulum, elbow flex/ext, pron/sup, wrist flex/ext, fist ; written instructions with pictures issued, patient able to demonstrate exercises. 11/23/20:   Patient instructed in flexion and scaption table slides written instructions with pictures issued, patient able to demonstrate exercises. 11/25/20:   Patient instructed in isometric shoulder flex, ext ; written instructions with pictures issued, patient able to demonstrate exercises. 11/30/20: isometric shoulder abd and add  Manual Treatments:  Soft tissue mobilization; passive stretching to 120 flex, 110 scaption, 30 ER    Modalities:     Progression Towards Functional goals:  [x] Patient is progressing as expected towards functional goals listed. [] Progression is slowed due to complexities listed. [] Progression has been slowed due to co-morbidities. [] Plan just implemented, too soon to assess goals progression  [] Other:    Charges: Therapeutic Exercise:  [x] (05060) Provided verbal/tactile cueing for activities to restore or maintain strength, flexibility, endurance, ROM for improvements with self-care, mobility, lifting and ambulation. Neuromuscular Re-Education  [] (44506) Provided verbal/tactile cueing for activities to restore or maintain balance, coordination, kinesthetic sense, posture, motor skill, proprioception for self-care, mobility, lifting, and ambulation. Therapeutic Activities:    [] (42280) Provided verbal/tactile cueing to address functional limitations related to loss of mobility, strength, balance, and coordination.      Gait Training:  [] (91413) Provided training and instruction to the patient for proper postural muscle recruitment and positioning with ambulation re-education     Home Exercise Program:    [x] (17281) Reviewed/Progressed HEP activities related to strengthening, flexibility, endurance, ROM for functional self-care, mobility, lifting and ambulation   [] (85600) Reviewed/Progressed HEP activities related to improving balance, coordination, kinesthetic sense, posture, motor skill, proprioception for self-care, mobility, lifting, and ambulation      Manual Treatments:  MFR / STM / Oscillations-Mobs:  G-I, II, III, IV / Manipulation / MLD  [x] (96130) Provided manual therapy to mobilize  soft tissue/joints/fluid for the purpose of modulating pain, promoting relaxation, increasing ROM, reducing/eliminating soft tissue swelling/inflammation/restriction, improving soft tissue extensibility and allowing for proper ROM for normal function with self- care, mobility, lifting and ambulation. Timed Code Treatment Minutes: 45   Total Treatment Minutes: 45     [] EVAL (LOW) 19005   [] EVAL (MOD) 96646   [] EVAL (HIGH) 01752   [] RE-EVAL   [x] TE (25707) x     [] Aquatic (09768) x  [] NMR (19900)   x  [] Aquatic Group (17781) x  [x] Manual (77591) x  2  [] Ultrasound (00511) x  [] TA (71676) x  [] Mech Traction (64055)  [] Ionto (96103)           [] ES (un) (70428):   [] Vasopump (69795) [] Other:      Assessment  [x] Patient tolerated treatment well [] Patient limited by fatigue  [] Patient limited by pain  [] Patient limited by other medical complications  [] Other:     Prognosis: [x] Good [] Fair  [] Poor    Goals:    Short term goals  Time Frame for Short term goals: 4-6 weeks  Short term goal 1: Pain </= 0/10 at rest, 2/10 with activity  Short term goal 2: Patient able to demonstrate AROM L shoulder WFL  Short term goal 3: Patient able to demonstrate strength L shoulder >/= 3+/5  Short term goal 4: Patient able to perform light chores without increased symptoms  Short term goal 5: Patient independent with written home exercise program            Patient Requires Follow-up: [x] Yes  [] No    Plan:   [x] Continue per plan of care [] Alter current plan (see comments)  [] Plan of care initiated [] Hold pending MD visit [] Discharge  Note: If patient does not return for scheduled/ recommended follow up visits, this note will serve as a discharge from care along with most recent update on progress. Plan for Next Session:  Monitor response.   Soft tissue mob, PROM L shoulder. Instruct patient in abd, add isometrics for HEP.   Reasses for MD visit    Electronically signed by:  Nakia Avina, [address] [facility]

## 2020-12-07 ENCOUNTER — HOSPITAL ENCOUNTER (OUTPATIENT)
Dept: PHYSICAL THERAPY | Age: 65
Setting detail: THERAPIES SERIES
Discharge: HOME | End: 2020-12-07
Payer: COMMERCIAL

## 2020-12-07 PROCEDURE — 97140 MANUAL THERAPY 1/> REGIONS: CPT

## 2020-12-07 PROCEDURE — 97110 THERAPEUTIC EXERCISES: CPT

## 2020-12-07 NOTE — PROGRESS NOTES
Outpatient Physical Therapy  [] [facility]    Phone: [phone]   Fax: [phone]   [] [facility]  Phone: [phone]   Fax: [phone]  [x] [facility]              Phone: [phone]   Fax: [phone]     Physical Therapy Progress Note  Date: 2020        Patient Name:  Lex Solano    :  1955  MRN: [id number]  Restrictions/Precautions:  Restrictions/Precautions  Restrictions/Precautions: Fall Risk(Low)  Position Activity Restriction  Other position/activity restrictions: Rotator Cuff Repair Protocol  Sling:  Medium tear:  4 wks at all times, then 2 wks for activities  DOS:  20         Motion:  POD 1 (20):  Pendulum exercises, elbow/wrist/hand ROM  POD 14 (20): Supine PROM:  Flex to 130, abd to 40, ER to tolerance w/45 degree abd Start stationary bike  POD 21 (20):  P/AAROM:  Flex to 150, abd to 70, ER to tolerance with 45 degree abd  POD 42 (20):  P/AAROM:  Flex to 180, abd and ER to tolerance                  Begin active ROM, progress to full AROM as comfort allows     Strengthenin wk (Only small/medium tears): Gentle strengthening, isometrics, pain-free  10 wks:  Initiate full strengthening program; theraband ER/IR, side-lying ER;                Deltoid lateral raise; Elyssa-scapular strengthening; ;Progressive resistance  Pertinent Medical History:       Medical/Treatment Diagnosis Information:  · Diagnosis: s/p Left Rotator Cuff Repair Z98.890  · Treatment Diagnosis: s/p L Rotator Cuff Repair, L shoulder Pain, Decreased Functional Mobility     Insurance/Certification information:  PT Insurance Information: [facility] [employer], allowed 30 PT visits per year, no prior authorization required  Physician Information:  Referring Practitioner: Laura Martin MD  Plan of care signed (Y/N):  Cosign received      Visit# / total visits:  10/12  Pain level:      0/10 at best, 3-4/10 at worst        Functional Outcomes Measure:  Test: QuickDASH  Score:21 = 22.73% Disability  (11/11/20)    Time Period for Report:  11/11/20 thru 12/7/20  Cancels/No-shows to date:  None    Plan of Care/Treatment to date:  [x] Therapeutic Exercise    [] Modalities:  [x] Therapeutic Activity     [] Ultrasound  [] Electrical Stimulation  [] Gait Training      [] Cervical Traction    [] Lumbar Traction  [x] Neuromuscular Re-education  [] Cold/hotpack [] Iontophoresis  [x] Instruction in HEP      Other:  [x] Manual Therapy       []    [] Aquatic Therapy       []                          Significant Findings At Last Visit/Comments:       Subjective:   Patient reports shoulder is doing pretty good, notices stiffness in elbow.     Objective:  · Observation:   · Test measurements:    DATE:                              12/7/20    PROM AROM AROM     (Left) (Left) Right   Flexion 130 100 165   Extension 50 45 65   Abduction 100 90 165   Internal Rotation 70 60 80   External Rotation 30 20 70        Assessment:   Summary: Patient has been seen for 10 PT visits with significant improvement noted, however goals have not yet been met. Patient would benefit from continued PT. Progression Towards Functional goals:  [x] Patient is progressing as expected towards functional goals listed. [] Progression is slowed due to complexities listed. [] Progression has been slowed due to co-morbidities.   [] Plan just implemented, too soon to assess goals progression  [] Other:    Goals:  Short term goals  Time Frame for Short term goals: 4-6 weeks  Short term goal 1: Pain </= 0/10 at rest, 2/10 with activity  Improved but not met  Short term goal 2: Patient able to demonstrate AROM L shoulder WFL  Improved but not met  Short term goal 3: Patient able to demonstrate strength L shoulder >/= 3+/5  Improved but not met  Short term goal 4: Patient able to perform light chores without increased symptoms  Improved but not met  Short term goal 5: Patient independent with written home exercise program Ongoing Rehab Potential: [] Excellent [x] Good [] Fair  [] Poor     Goal Status:  [] Achieved [x] Partially Achieved  [] Not Achieved     Current Frequency/Duration:  # Days per week: [] 1 day # Weeks: [] 1 week [] 4 weeks      [x] 2 days   [] 2 weeks [] 5 weeks      [] 3 days   [] 3 weeks [x] 6 weeks     Patient Status: [x] Continue per initial plan of Care     [] Patient now discharged     [x] Additional visits requested, Please re-certify for additional visits:      Requested frequency/duration:  1-3/week for 4-6weeks    Electronically signed by:  Buddy Huang, [address] [place] [address]    If you have any questions or concerns, please don't hesitate to call.   Thank you for your referral.    Physician Signature:________________________________Date:__________________  By signing above, therapists plan is approved by physician

## 2020-12-10 ENCOUNTER — HOSPITAL ENCOUNTER (OUTPATIENT)
Dept: PHYSICAL THERAPY | Age: 65
Setting detail: THERAPIES SERIES
Discharge: HOME | End: 2020-12-10
Payer: COMMERCIAL

## 2020-12-10 ENCOUNTER — OFFICE VISIT (OUTPATIENT)
Dept: ORTHOPEDIC SURGERY | Age: 65
End: 2020-12-10

## 2020-12-10 VITALS — TEMPERATURE: 97.9 F | HEIGHT: 69 IN | WEIGHT: 202 LBS | BODY MASS INDEX: 29.92 KG/M2

## 2020-12-10 PROCEDURE — 99024 POSTOP FOLLOW-UP VISIT: CPT | Performed by: ORTHOPAEDIC SURGERY

## 2020-12-10 PROCEDURE — 97140 MANUAL THERAPY 1/> REGIONS: CPT

## 2020-12-10 PROCEDURE — 97110 THERAPEUTIC EXERCISES: CPT

## 2020-12-10 NOTE — PROGRESS NOTES
Dallas Rehman  <[id number]>  December 10, 2020    Chief Complaint   Patient presents with   [facility] Post-Op Check     11/4/20 left shoulder rotator cuff repair       History: The patient is 5 weeks status post left shoulder rotator cuff repair. He minimal pain in the left shoulder. Most of the pain occurs the day after his therapy sessions. He denies any numbness or tingling. He denies any fever or chills. The patient's  past medical history, medications, allergies,  family history, social history, and review of systems have been reviewed, and dated and are recorded in the chart. Temp 97.9 °F (36.6 °C)   Ht 5' 8.5\" (1.74 m)   Wt 202 lb (91.6 kg)   BMI 30.27 kg/m²     Physical:  Mr. Dallas Rehman appears well, he is in no apparent distress, he demonstrates appropriate mood & affect. He is alert and oriented to person, place and time. He has mild swelling. He is neurovascularly intact distally. Sensation is intact in the axillary nerve distribution. left shoulder range of motion: 135 degrees abduction, 140 degrees forward flexion, 25 degrees external rotation and internal rotation to L4. He has minimal pain with light range of motion of the shoulder. The incisions are clean, dry and intact and without erythema. Strength in the shoulder is: 3/5-  Abduction and 3/5-  external rotation. Impression: status post left shoulder arthroscopy, subacromial decompression and lux and rotator cuff repair    Plan: At this time, the patient will continue with his outpatient therapy. We will increase his therapy sessions to 3 times a week. He may begin active range of motion as well as light strengthening. He will follow-up with me in approximately 4 weeks and we will reassess him then. He will continue to avoid overhead activities and limit his pushing, pulling and lifting. He is aware that activities should be limited until full range of motion and comfort have been regained.   I have explained the time course and likely expectations for maximal recovery of motion and function. He will follow up with me in 4 weeks.

## 2020-12-10 NOTE — FLOWSHEET NOTE
Physical Therapy Daily Treatment Note  Date:  12/10/2020    Patient Name:  Michelle Monk    :  1955  MRN: [id number]    Restrictions/Precautions:  Restrictions/Precautions  Restrictions/Precautions: Fall Risk(Low)  Position Activity Restriction  Other position/activity restrictions: Rotator Cuff Repair Protocol  Sling:  Medium tear:  4 wks at all times, then 2 wks for activities  DOS:  20         Motion:  POD 1 (20):  Pendulum exercises, elbow/wrist/hand ROM  POD 14 (20): Supine PROM:  Flex to 130, abd to 40, ER to tolerance w/45 degree abd Start stationary bike  POD 21 (20):  P/AAROM:  Flex to 150, abd to 70, ER to tolerance with 45 degree abd  POD 42 (20):  P/AAROM:  Flex to 180, abd and ER to tolerance                  Begin active ROM, progress to full AROM as comfort allows    Strengthenin wk (Only small/medium tears): Gentle strengthening, isometrics, pain-free  10 wks:  Initiate full strengthening program; theraband ER/IR, side-lying ER;                Deltoid lateral raise; Elyssa-scapular strengthening; ;Progressive resistance  Pertinent Medical History:      Medical/Treatment Diagnosis Information:  · Diagnosis: s/p Left Rotator Cuff Repair Z98.890  · Treatment Diagnosis: s/p L Rotator Cuff Repair, L shoulder Pain, Decreased Functional Mobility    Insurance/Certification information:  PT Insurance Information: [address] [place] [address], allowed 30 PT visits per year, no prior authorization required  Physician Information:  Referring Practitioner: Rosalind Garibay MD  Plan of care signed (Y/N): Cosign received     Visit# / total visits:    Pain level: 0/10 at best, 3-4/10 at worst     Functional Outcomes Measure:  Test: QuickDASH  Score:21 = 22.73% Disability  (20)    Progress Note: [x]  Yes  []  No  Next due by: Visit #10      History of Injury:Patient reports a history of Left shoulder pain x 1 year.   \"If I did anything physical with my arm it would hurt.  I couldn't sleep some nights\"  Patient saw Dr. Aletha Hinds, had x-ray, MRI, RTC tear, 2 bone spurs, arthritis in shoulder. Patient underwent arthroscopic RTC repair, Juno, chondroplasty, labral debridement at [facility] on 11/4/20. Patient reports  pain primarilly after block wore off, but has improved. Patient has used pain medication and/or ibuprofen prn, sparingly. Patient is not able to sleep, patient is using R UE for ADLs. Patient is limiting activities due to L shoulder. patient is off work at present. Subjective:   Patient reports noting shoulder soreness the day after PT, primarilly in scapular region. Patient saw MD, advised to continue PT with focus on AROM and strengthening. Objective:   Observation:    Test measurements:    DATE:                              12/7/20   PROM AROM AROM    (Left) (Left) Right   Flexion 130 100 165   Extension 50 45 65   Abduction 100 90 165   Internal Rotation 70 60 80   External Rotation 30 20 70           Exercises:  Exercise/Equipment Resistance/Repetitions Other comments        Pulley flexion 20x sitting   Gym Ball on Table Green, 1 x 15 reps, flexion  Green, 1 x 15, scaption standing   UE [place] Flexion, 1 x 10 reps standing        Theraslide Lat Pull Down, 1 x 10 reps, yellow  Row, 1 x 10 reps, yellow  Ext, 1 x 10 reps, yellow standing                                              Other Therapeutic Activities:  11/11/20:  Discussed at length anatomy and biomechanics of the shoulder, muscle reeducation, motor recruitment. Home Exercise Program:  11/11/20:   Patient instructed in lower trap sets, Codman's pendulum, elbow flex/ext, pron/sup, wrist flex/ext, fist ; written instructions with pictures issued, patient able to demonstrate exercises. 11/23/20:   Patient instructed in flexion and scaption table slides written instructions with pictures issued, patient able to demonstrate exercises.   11/25/20:   Patient instructed in isometric shoulder flex, ext ; written instructions with pictures issued, patient able to demonstrate exercises. 11/30/20: isometric shoulder abd and add  12/10/20:   Patient instructed in isometric IR, ER ; written instructions with pictures issued, patient able to demonstrate exercises. Manual Treatments:  Soft tissue mobilization; passive stretching to 150 flex, 140 scaption, 40 ER    Modalities:     Progression Towards Functional goals:  [x] Patient is progressing as expected towards functional goals listed. [] Progression is slowed due to complexities listed. [] Progression has been slowed due to co-morbidities. [] Plan just implemented, too soon to assess goals progression  [] Other:    Charges: Therapeutic Exercise:  [x] (95943) Provided verbal/tactile cueing for activities to restore or maintain strength, flexibility, endurance, ROM for improvements with self-care, mobility, lifting and ambulation. Neuromuscular Re-Education  [] (01191) Provided verbal/tactile cueing for activities to restore or maintain balance, coordination, kinesthetic sense, posture, motor skill, proprioception for self-care, mobility, lifting, and ambulation. Therapeutic Activities:    [] (65980) Provided verbal/tactile cueing to address functional limitations related to loss of mobility, strength, balance, and coordination.      Gait Training:  [] (79186) Provided training and instruction to the patient for proper postural muscle recruitment and positioning with ambulation re-education     Home Exercise Program:    [x] (91779) Reviewed/Progressed HEP activities related to strengthening, flexibility, endurance, ROM for functional self-care, mobility, lifting and ambulation   [] (53963) Reviewed/Progressed HEP activities related to improving balance, coordination, kinesthetic sense, posture, motor skill, proprioception for self-care, mobility, lifting, and ambulation      Manual Treatments:  MFR / STM / Oscillations-Mobs:  G-I, II, III, IV / Jose Antonio Andrews / KWASID  [x] (63377) Provided manual therapy to mobilize  soft tissue/joints/fluid for the purpose of modulating pain, promoting relaxation, increasing ROM, reducing/eliminating soft tissue swelling/inflammation/restriction, improving soft tissue extensibility and allowing for proper ROM for normal function with self- care, mobility, lifting and ambulation. Timed Code Treatment Minutes: 45   Total Treatment Minutes: 45     [] EVAL (LOW) 32779   [] EVAL (MOD) 62324   [] EVAL (HIGH) 92346   [] RE-EVAL   [x] TE (44574) x     [] Aquatic (38602) x  [] NMR (37527)   x  [] Aquatic Group (27723) x  [x] Manual (71380) x  2  [] Ultrasound (18068) x  [] TA (94963) x  [] Mech Traction (32144)  [] Ionto (68967)           [] ES (un) (78932):   [] Vasopump (04360) [] Other:      Assessment  [x] Patient tolerated treatment well [] Patient limited by fatigue  [] Patient limited by pain  [] Patient limited by other medical complications  [] Other:     Prognosis: [x] Good [] Fair  [] Poor    Goals:    Short term goals  Time Frame for Short term goals: 4-6 weeks  Short term goal 1: Pain </= 0/10 at rest, 2/10 with activity  Improved but not met  Short term goal 2: Patient able to demonstrate AROM L shoulder WFL  Improved but not met  Short term goal 3: Patient able to demonstrate strength L shoulder >/= 3+/5  Improved but not met  Short term goal 4: Patient able to perform light chores without increased symptoms  Improved but not met  Short term goal 5: Patient independent with written home exercise program Ongoing           Patient Requires Follow-up: [x] Yes  [] No    Plan:   [x] Continue per plan of care [] Alter current plan (see comments)  [] Plan of care initiated [] Hold pending MD visit [] Discharge  Note: If patient does not return for scheduled/ recommended follow up visits, this note will serve as a discharge from care along with most recent update on progress. Plan for Next Session:  Monitor response. Soft tissue mob, PROM L shoulder. Instruct patient in doorway stretch for HEP.      Electronically signed by:  Sirena Oconnor, [address] [facility],[address]

## 2020-12-14 ENCOUNTER — HOSPITAL ENCOUNTER (OUTPATIENT)
Dept: PHYSICAL THERAPY | Age: 65
Setting detail: THERAPIES SERIES
Discharge: HOME | End: 2020-12-14
Payer: COMMERCIAL

## 2020-12-14 PROCEDURE — 97110 THERAPEUTIC EXERCISES: CPT

## 2020-12-14 PROCEDURE — 97140 MANUAL THERAPY 1/> REGIONS: CPT

## 2020-12-14 NOTE — FLOWSHEET NOTE
Physical Therapy Daily Treatment Note  Date:  2020    Patient Name:  Lex Sloano    :  1955  MRN: [id number]    Restrictions/Precautions:  Restrictions/Precautions  Restrictions/Precautions: Fall Risk(Low)  Position Activity Restriction  Other position/activity restrictions: Rotator Cuff Repair Protocol  Sling:  Medium tear:  4 wks at all times, then 2 wks for activities  DOS:  20         Motion:  POD 1 (20):  Pendulum exercises, elbow/wrist/hand ROM  POD 14 (20): Supine PROM:  Flex to 130, abd to 40, ER to tolerance w/45 degree abd Start stationary bike  POD 21 (20):  P/AAROM:  Flex to 150, abd to 70, ER to tolerance with 45 degree abd  POD 42 (20):  P/AAROM:  Flex to 180, abd and ER to tolerance                  Begin active ROM, progress to full AROM as comfort allows    Strengthenin wk (Only small/medium tears): Gentle strengthening, isometrics, pain-free  10 wks:  Initiate full strengthening program; theraband ER/IR, side-lying ER;                Deltoid lateral raise; Elyssa-scapular strengthening; ;Progressive resistance  Pertinent Medical History:      Medical/Treatment Diagnosis Information:  · Diagnosis: s/p Left Rotator Cuff Repair Z98.890  · Treatment Diagnosis: s/p L Rotator Cuff Repair, L shoulder Pain, Decreased Functional Mobility    Insurance/Certification information:  PT Insurance Information: [address] [place] [address], allowed 30 PT visits per year, no prior authorization required  Physician Information:  Referring Practitioner: Laura Martin MD  Plan of care signed (Y/N): Cosign received     Visit# / total visits:    Pain level: 0/10 at best, 3-4/10 at worst     Functional Outcomes Measure:  Test: QuickDASH  Score:21 = 22.73% Disability  (20)    Progress Note: [x]  Yes  []  No  Next due by: Visit #10      History of Injury:Patient reports a history of Left shoulder pain x 1 year.   \"If I did anything physical with my arm it would shoulder flex, ext ; written instructions with pictures issued, patient able to demonstrate exercises. 11/30/20: isometric shoulder abd and add  12/10/20:   Patient instructed in isometric IR, ER ; written instructions with pictures issued, patient able to demonstrate exercises. 12/14/20:   Patient instructed in doorway stretch ; written instructions with pictures issued, patient able to demonstrate exercises. Manual Treatments:  Soft tissue mobilization; passive stretching to 150 flex, 140 scaption, 40 ER    Modalities:     Progression Towards Functional goals:  [x] Patient is progressing as expected towards functional goals listed. [] Progression is slowed due to complexities listed. [] Progression has been slowed due to co-morbidities. [] Plan just implemented, too soon to assess goals progression  [] Other:    Charges: Therapeutic Exercise:  [x] (62363) Provided verbal/tactile cueing for activities to restore or maintain strength, flexibility, endurance, ROM for improvements with self-care, mobility, lifting and ambulation. Neuromuscular Re-Education  [] (49551) Provided verbal/tactile cueing for activities to restore or maintain balance, coordination, kinesthetic sense, posture, motor skill, proprioception for self-care, mobility, lifting, and ambulation. Therapeutic Activities:    [] (89861) Provided verbal/tactile cueing to address functional limitations related to loss of mobility, strength, balance, and coordination.      Gait Training:  [] (31186) Provided training and instruction to the patient for proper postural muscle recruitment and positioning with ambulation re-education     Home Exercise Program:    [x] (31786) Reviewed/Progressed HEP activities related to strengthening, flexibility, endurance, ROM for functional self-care, mobility, lifting and ambulation   [] (86923) Reviewed/Progressed HEP activities related to improving balance, coordination, kinesthetic sense, posture, motor up visits, this note will serve as a discharge from care along with most recent update on progress. Plan for Next Session:  Monitor response. Soft tissue mob, PROM L shoulder. Instruct patient in theraband stretch for HEP.      Electronically signed by:  Anahi Kim, [address] [facility]

## 2020-12-17 ENCOUNTER — HOSPITAL ENCOUNTER (OUTPATIENT)
Dept: PHYSICAL THERAPY | Age: 65
Setting detail: THERAPIES SERIES
Discharge: HOME | End: 2020-12-17
Payer: COMMERCIAL

## 2020-12-17 PROCEDURE — 97140 MANUAL THERAPY 1/> REGIONS: CPT

## 2020-12-17 PROCEDURE — 97110 THERAPEUTIC EXERCISES: CPT

## 2020-12-17 NOTE — FLOWSHEET NOTE
Physical Therapy Daily Treatment Note  Date:  2020    Patient Name:  Cuong Ramírez    :  1955  MRN: [id number]    Restrictions/Precautions:  Restrictions/Precautions  Restrictions/Precautions: Fall Risk(Low)  Position Activity Restriction  Other position/activity restrictions: Rotator Cuff Repair Protocol  Sling:  Medium tear:  4 wks at all times, then 2 wks for activities  DOS:  20         Motion:  POD 1 (20):  Pendulum exercises, elbow/wrist/hand ROM  POD 14 (20): Supine PROM:  Flex to 130, abd to 40, ER to tolerance w/45 degree abd Start stationary bike  POD 21 (20):  P/AAROM:  Flex to 150, abd to 70, ER to tolerance with 45 degree abd  POD 42 (20):  P/AAROM:  Flex to 180, abd and ER to tolerance                  Begin active ROM, progress to full AROM as comfort allows    Strengthenin wk (Only small/medium tears): Gentle strengthening, isometrics, pain-free  10 wks:  Initiate full strengthening program; theraband ER/IR, side-lying ER;                Deltoid lateral raise; Elyssa-scapular strengthening; ;Progressive resistance  Pertinent Medical History:      Medical/Treatment Diagnosis Information:  · Diagnosis: s/p Left Rotator Cuff Repair Z98.890  · Treatment Diagnosis: s/p L Rotator Cuff Repair, L shoulder Pain, Decreased Functional Mobility    Insurance/Certification information:  PT Insurance Information: [address] [place] [address], allowed 30 PT visits per year, no prior authorization required  Physician Information:  Referring Practitioner: Hosea Malik MD  Plan of care signed (Y/N): Cosign received     Visit# / total visits:    Pain level: 0/10 at best, 3-4/10 at worst     Functional Outcomes Measure:  Test: QuickDASH  Score:21 = 22.73% Disability  (20)    Progress Note: [x]  Yes  []  No  Next due by: Visit #10      History of Injury:Patient reports a history of Left shoulder pain x 1 year.   \"If I did anything physical with my arm it would hurt.  I couldn't sleep some nights\"  Patient saw Dr. Sumanth Houser, had x-ray, MRI, RTC tear, 2 bone spurs, arthritis in shoulder. Patient underwent arthroscopic RTC repair, Juno, chondroplasty, labral debridement at [facility] on 11/4/20. Patient reports  pain primarilly after block wore off, but has improved. Patient has used pain medication and/or ibuprofen prn, sparingly. Patient is not able to sleep, patient is using R UE for ADLs. Patient is limiting activities due to L shoulder. patient is off work at present. Subjective:   Patient reports shoulder is mostly \"tired\" and \"sore\" after exercise. Objective:   Observation:    Test measurements:    DATE:                              12/7/20   PROM AROM AROM    (Left) (Left) Right   Flexion 130 100 165   Extension 50 45 65   Abduction 100 90 165   Internal Rotation 70 60 80   External Rotation 30 20 70           Exercises:  Exercise/Equipment Resistance/Repetitions Other comments        Pulley flexion 20x sitting   Gym Ball on Table Green, 1 x 15 reps, flexion  Green, 1 x 15, scaption standing   UE [place] Flexion, 1 x 10 reps standing        Theraslide Lat Pull Down, 1 x 10 reps, yellow  Row, 1 x 10 reps, yellow  Ext, 1 x 10 reps, yellow  IR, 1 x 10 reps, yellow  ER, 1 x 10 reps, yellow standing                                              Other Therapeutic Activities:  11/11/20:  Discussed at length anatomy and biomechanics of the shoulder, muscle reeducation, motor recruitment. Home Exercise Program:  11/11/20:   Patient instructed in lower trap sets, Codman's pendulum, elbow flex/ext, pron/sup, wrist flex/ext, fist ; written instructions with pictures issued, patient able to demonstrate exercises. 11/23/20:   Patient instructed in flexion and scaption table slides written instructions with pictures issued, patient able to demonstrate exercises.   11/25/20:   Patient instructed in isometric shoulder flex, ext ; written instructions with pictures issued, patient able to demonstrate exercises. 11/30/20: isometric shoulder abd and add  12/10/20:   Patient instructed in isometric IR, ER ; written instructions with pictures issued, patient able to demonstrate exercises. 12/14/20:   Patient instructed in doorway stretch ; written instructions with pictures issued, patient able to demonstrate exercises. Manual Treatments:  Soft tissue mobilization; passive stretching to 150 flex, 140 scaption, 40 ER    Modalities:     Progression Towards Functional goals:  [x] Patient is progressing as expected towards functional goals listed. [] Progression is slowed due to complexities listed. [] Progression has been slowed due to co-morbidities. [] Plan just implemented, too soon to assess goals progression  [] Other:    Charges: Therapeutic Exercise:  [x] (97557) Provided verbal/tactile cueing for activities to restore or maintain strength, flexibility, endurance, ROM for improvements with self-care, mobility, lifting and ambulation. Neuromuscular Re-Education  [] (17906) Provided verbal/tactile cueing for activities to restore or maintain balance, coordination, kinesthetic sense, posture, motor skill, proprioception for self-care, mobility, lifting, and ambulation. Therapeutic Activities:    [] (99686) Provided verbal/tactile cueing to address functional limitations related to loss of mobility, strength, balance, and coordination.      Gait Training:  [] (64454) Provided training and instruction to the patient for proper postural muscle recruitment and positioning with ambulation re-education     Home Exercise Program:    [x] (91190) Reviewed/Progressed HEP activities related to strengthening, flexibility, endurance, ROM for functional self-care, mobility, lifting and ambulation   [] (91378) Reviewed/Progressed HEP activities related to improving balance, coordination, kinesthetic sense, posture, motor skill, proprioception for self-care, mobility, lifting, care along with most recent update on progress. Plan for Next Session:  Monitor response. Soft tissue mob, PROM L shoulder. Instruct patient in theraband stretch for HEP.      Electronically signed by:  Sirena Oconnor, [address],[facility]

## 2020-12-21 ENCOUNTER — HOSPITAL ENCOUNTER (OUTPATIENT)
Dept: PHYSICAL THERAPY | Age: 65
Setting detail: THERAPIES SERIES
Discharge: HOME | End: 2020-12-21
Payer: COMMERCIAL

## 2020-12-21 PROCEDURE — 97140 MANUAL THERAPY 1/> REGIONS: CPT

## 2020-12-21 PROCEDURE — 97110 THERAPEUTIC EXERCISES: CPT

## 2020-12-21 NOTE — FLOWSHEET NOTE
Physical Therapy Daily Treatment Note  Date:  2020    Patient Name:  Michele Holder    :  1955  MRN: [id number]    Restrictions/Precautions:  Restrictions/Precautions  Restrictions/Precautions: Fall Risk(Low)  Position Activity Restriction  Other position/activity restrictions: Rotator Cuff Repair Protocol  Sling:  Medium tear:  4 wks at all times, then 2 wks for activities  DOS:  20         Motion:  POD 1 (20):  Pendulum exercises, elbow/wrist/hand ROM  POD 14 (20): Supine PROM:  Flex to 130, abd to 40, ER to tolerance w/45 degree abd Start stationary bike  POD 21 (20):  P/AAROM:  Flex to 150, abd to 70, ER to tolerance with 45 degree abd  POD 42 (20):  P/AAROM:  Flex to 180, abd and ER to tolerance                  Begin active ROM, progress to full AROM as comfort allows    Strengthenin wk (Only small/medium tears): Gentle strengthening, isometrics, pain-free  10 wks:  Initiate full strengthening program; theraband ER/IR, side-lying ER;                Deltoid lateral raise; Elyssa-scapular strengthening; ;Progressive resistance  Pertinent Medical History:      Medical/Treatment Diagnosis Information:  · Diagnosis: s/p Left Rotator Cuff Repair Z98.890  · Treatment Diagnosis: s/p L Rotator Cuff Repair, L shoulder Pain, Decreased Functional Mobility    Insurance/Certification information:  PT Insurance Information: [address] [place] [address], allowed 30 PT visits per year, no prior authorization required  Physician Information:  Referring Practitioner: Kathy Mendez MD  Plan of care signed (Y/N): Cosign received     Visit# / total visits:    Pain level: 0/10 at best, 3-4/10 at worst     Functional Outcomes Measure:  Test: QuickDASH  Score:21 = 22.73% Disability  (20)    Progress Note: [x]  Yes  []  No  Next due by: Visit #10      History of Injury:Patient reports a history of Left shoulder pain x 1 year.   \"If I did anything physical with my arm it would hurt.  I couldn't sleep some nights\"  Patient saw Dr. Marcellus Guerra, had x-ray, MRI, RTC tear, 2 bone spurs, arthritis in shoulder. Patient underwent arthroscopic RTC repair, Juno, chondroplasty, labral debridement at [facility] on 11/4/20. Patient reports  pain primarilly after block wore off, but has improved. Patient has used pain medication and/or ibuprofen prn, sparingly. Patient is not able to sleep, patient is using R UE for ADLs. Patient is limiting activities due to L shoulder. patient is off work at present. Subjective:   Patient reports shoulder is mostly \"tired\" and \"sore\" after exercise. Objective:   Observation:    Test measurements:    DATE:                              12/7/20   PROM AROM AROM    (Left) (Left) Right   Flexion 130 100 165   Extension 50 45 65   Abduction 100 90 165   Internal Rotation 70 60 80   External Rotation 30 20 70           Exercises:  Exercise/Equipment Resistance/Repetitions Other comments        Pulley flexion 20x sitting   Gym Ball on Table Green, 1 x 15 reps, flexion  Green, 1 x 15, scaption standing   UE [place] Flexion, 1 x 10 reps standing        Theraslide Lat Pull Down, 1 x 10 reps, green  Row, 1 x 10 reps, green  Ext, 1 x 10 reps, green  IR, 1 x 10 reps, yellow  ER, 1 x 10 reps, yellow standing                                              Other Therapeutic Activities:  11/11/20:  Discussed at length anatomy and biomechanics of the shoulder, muscle reeducation, motor recruitment. Home Exercise Program:  11/11/20:   Patient instructed in lower trap sets, Codman's pendulum, elbow flex/ext, pron/sup, wrist flex/ext, fist ; written instructions with pictures issued, patient able to demonstrate exercises. 11/23/20:   Patient instructed in flexion and scaption table slides written instructions with pictures issued, patient able to demonstrate exercises.   11/25/20:   Patient instructed in isometric shoulder flex, ext ; written instructions with pictures issued, patient able to demonstrate exercises. 11/30/20: isometric shoulder abd and add  12/10/20:   Patient instructed in isometric IR, ER ; written instructions with pictures issued, patient able to demonstrate exercises. 12/14/20:   Patient instructed in doorway stretch ; written instructions with pictures issued, patient able to demonstrate exercises. 12/21/20:   Patient instructed in wall slide with step flex and scaption ; written instructions with pictures issued, patient able to demonstrate exercises. Manual Treatments:  Soft tissue mobilization; passive stretching to 150 flex, 140 scaption, 40 ER    Modalities:     Progression Towards Functional goals:  [x] Patient is progressing as expected towards functional goals listed. [] Progression is slowed due to complexities listed. [] Progression has been slowed due to co-morbidities. [] Plan just implemented, too soon to assess goals progression  [] Other:    Charges: Therapeutic Exercise:  [x] (66287) Provided verbal/tactile cueing for activities to restore or maintain strength, flexibility, endurance, ROM for improvements with self-care, mobility, lifting and ambulation. Neuromuscular Re-Education  [] (92852) Provided verbal/tactile cueing for activities to restore or maintain balance, coordination, kinesthetic sense, posture, motor skill, proprioception for self-care, mobility, lifting, and ambulation. Therapeutic Activities:    [] (49385) Provided verbal/tactile cueing to address functional limitations related to loss of mobility, strength, balance, and coordination.      Gait Training:  [] (46515) Provided training and instruction to the patient for proper postural muscle recruitment and positioning with ambulation re-education     Home Exercise Program:    [x] (10471) Reviewed/Progressed HEP activities related to strengthening, flexibility, endurance, ROM for functional self-care, mobility, lifting and ambulation   [] (62848) Reviewed/Progressed HEP activities related to improving balance, coordination, kinesthetic sense, posture, motor skill, proprioception for self-care, mobility, lifting, and ambulation      Manual Treatments:  MFR / STM / Oscillations-Mobs:  G-I, II, III, IV / Manipulation / MLD  [x] (71246) Provided manual therapy to mobilize  soft tissue/joints/fluid for the purpose of modulating pain, promoting relaxation, increasing ROM, reducing/eliminating soft tissue swelling/inflammation/restriction, improving soft tissue extensibility and allowing for proper ROM for normal function with self- care, mobility, lifting and ambulation.         Timed Code Treatment Minutes: 45   Total Treatment Minutes: 45     [] EVAL (LOW) 54659   [] EVAL (MOD) 89611   [] EVAL (HIGH) 77576   [] RE-EVAL   [x] TE (20025) x     [] Aquatic (91253) x  [] NMR (80054)   x  [] Aquatic Group (11830) x  [x] Manual (98252) x  2  [] Ultrasound (60295) x  [] TA (39443) x  [] Mech Traction (71351)  [] Ionto (75829)           [] ES (un) (02534):   [] Vasopump (65151) [] Other:      Assessment  [x] Patient tolerated treatment well [] Patient limited by fatigue  [] Patient limited by pain  [] Patient limited by other medical complications  [] Other:     Prognosis: [x] Good [] Fair  [] Poor    Goals:    Short term goals  Time Frame for Short term goals: 4-6 weeks  Short term goal 1: Pain </= 0/10 at rest, 2/10 with activity  Improved but not met  Short term goal 2: Patient able to demonstrate AROM L shoulder WFL  Improved but not met  Short term goal 3: Patient able to demonstrate strength L shoulder >/= 3+/5  Improved but not met  Short term goal 4: Patient able to perform light chores without increased symptoms  Improved but not met  Short term goal 5: Patient independent with written home exercise program Ongoing           Patient Requires Follow-up: [x] Yes  [] No    Plan:   [x] Continue per plan of care [] Alter current plan (see comments)  [] Plan of care initiated [] Hold pending MD visit [] Discharge  Note: If patient does not return for scheduled/ recommended follow up visits, this note will serve as a discharge from care along with most recent update on progress. Plan for Next Session:  Monitor response. Soft tissue mob, PROM L shoulder. Instruct patient in theraband ex for HEP.      Electronically signed by:  Puja Tsang, [facility]

## 2020-12-24 ENCOUNTER — HOSPITAL ENCOUNTER (OUTPATIENT)
Dept: PHYSICAL THERAPY | Age: 65
Setting detail: THERAPIES SERIES
Discharge: HOME | End: 2020-12-24
Payer: COMMERCIAL

## 2020-12-24 PROCEDURE — 97140 MANUAL THERAPY 1/> REGIONS: CPT

## 2020-12-24 PROCEDURE — 97110 THERAPEUTIC EXERCISES: CPT

## 2020-12-24 NOTE — FLOWSHEET NOTE
Physical Therapy Daily Treatment Note  Date:  2020    Patient Name:  Ruben Rivas    :  1955  MRN: [id number]    Restrictions/Precautions:  Restrictions/Precautions  Restrictions/Precautions: Fall Risk(Low)  Position Activity Restriction  Other position/activity restrictions: Rotator Cuff Repair Protocol  Sling:  Medium tear:  4 wks at all times, then 2 wks for activities  DOS:  20         Motion:  POD 1 (20):  Pendulum exercises, elbow/wrist/hand ROM  POD 14 (20): Supine PROM:  Flex to 130, abd to 40, ER to tolerance w/45 degree abd Start stationary bike  POD 21 (20):  P/AAROM:  Flex to 150, abd to 70, ER to tolerance with 45 degree abd  POD 42 (20):  P/AAROM:  Flex to 180, abd and ER to tolerance                  Begin active ROM, progress to full AROM as comfort allows    Strengthenin wk (Only small/medium tears): Gentle strengthening, isometrics, pain-free  10 wks:  Initiate full strengthening program; theraband ER/IR, side-lying ER;                Deltoid lateral raise; Elyssa-scapular strengthening; ;Progressive resistance  Pertinent Medical History:      Medical/Treatment Diagnosis Information:  · Diagnosis: s/p Left Rotator Cuff Repair Z98.890  · Treatment Diagnosis: s/p L Rotator Cuff Repair, L shoulder Pain, Decreased Functional Mobility    Insurance/Certification information:  PT Insurance Information: [address] [place] [address], allowed 30 PT visits per year, no prior authorization required  Physician Information:  Referring Practitioner: Nancy Ndiaye MD  Plan of care signed (Y/N): Cosign received     Visit# / total visits:    Pain level: 0/10 at best, 3-4/10 at worst     Functional Outcomes Measure:  Test: QuickDASH  Score:21 = 22.73% Disability  (20)    Progress Note: [x]  Yes  []  No  Next due by: Visit #10      History of Injury:Patient reports a history of Left shoulder pain x 1 year.   \"If I did anything physical with my arm it would issued, patient able to demonstrate exercises. 11/30/20: isometric shoulder abd and add  12/10/20:   Patient instructed in isometric IR, ER ; written instructions with pictures issued, patient able to demonstrate exercises. 12/14/20:   Patient instructed in doorway stretch ; written instructions with pictures issued, patient able to demonstrate exercises. 12/21/20:   Patient instructed in wall slide with step flex and scaption ; written instructions with pictures issued, patient able to demonstrate exercises. 12/24/20 theraband lat pulls, rows, IR, ER, shoulder extension, fencing    Manual Treatments:  Soft tissue mobilization; passive stretching to 150 flex, 140 scaption, 40 ER    Modalities:     Progression Towards Functional goals:  [x] Patient is progressing as expected towards functional goals listed. [] Progression is slowed due to complexities listed. [] Progression has been slowed due to co-morbidities. [] Plan just implemented, too soon to assess goals progression  [] Other:    Charges: Therapeutic Exercise:  [x] (21863) Provided verbal/tactile cueing for activities to restore or maintain strength, flexibility, endurance, ROM for improvements with self-care, mobility, lifting and ambulation. Neuromuscular Re-Education  [] (21480) Provided verbal/tactile cueing for activities to restore or maintain balance, coordination, kinesthetic sense, posture, motor skill, proprioception for self-care, mobility, lifting, and ambulation. Therapeutic Activities:    [] (72157) Provided verbal/tactile cueing to address functional limitations related to loss of mobility, strength, balance, and coordination.      Gait Training:  [] (07959) Provided training and instruction to the patient for proper postural muscle recruitment and positioning with ambulation re-education     Home Exercise Program:    [x] (89649) Reviewed/Progressed HEP activities related to strengthening, flexibility, endurance, ROM for functional self-care, mobility, lifting and ambulation   [] (16145) Reviewed/Progressed HEP activities related to improving balance, coordination, kinesthetic sense, posture, motor skill, proprioception for self-care, mobility, lifting, and ambulation      Manual Treatments:  MFR / STM / Oscillations-Mobs:  G-I, II, III, IV / Manipulation / MLD  [x] (89733) Provided manual therapy to mobilize  soft tissue/joints/fluid for the purpose of modulating pain, promoting relaxation, increasing ROM, reducing/eliminating soft tissue swelling/inflammation/restriction, improving soft tissue extensibility and allowing for proper ROM for normal function with self- care, mobility, lifting and ambulation.         Timed Code Treatment Minutes: 45   Total Treatment Minutes: 45     [] EVAL (LOW) 60148   [] EVAL (MOD) 83885   [] EVAL (HIGH) 78632   [] RE-EVAL   [x] TE (68804) x     [] Aquatic (08323) x  [] NMR (31736)   x  [] Aquatic Group (15426) x  [x] Manual (95057) x  2  [] Ultrasound (92761) x  [] TA (78831) x  [] Mech Traction (97287)  [] Ionto (16350)           [] ES (un) (36655):   [] Vasopump (29080) [] Other:      Assessment  [x] Patient tolerated treatment well [] Patient limited by fatigue  [] Patient limited by pain  [] Patient limited by other medical complications  [] Other:     Prognosis: [x] Good [] Fair  [] Poor    Goals:    Short term goals  Time Frame for Short term goals: 4-6 weeks  Short term goal 1: Pain </= 0/10 at rest, 2/10 with activity  Improved but not met  Short term goal 2: Patient able to demonstrate AROM L shoulder WFL  Improved but not met  Short term goal 3: Patient able to demonstrate strength L shoulder >/= 3+/5  Improved but not met  Short term goal 4: Patient able to perform light chores without increased symptoms  Improved but not met  Short term goal 5: Patient independent with written home exercise program Ongoing           Patient Requires Follow-up: [x] Yes  [] No    Plan:   [x] Continue per plan of care [] Alter current plan (see comments)  [] Plan of care initiated [] Hold pending MD visit [] Discharge  Note: If patient does not return for scheduled/ recommended follow up visits, this note will serve as a discharge from care along with most recent update on progress. Plan for Next Session:  Monitor response. Soft tissue mob, PROM L shoulder. Instruct patient in theraband ex for HEP.      Electronically signed by:  Liz Wiseman, [address] [facility]

## 2021-01-04 ENCOUNTER — HOSPITAL ENCOUNTER (OUTPATIENT)
Dept: PHYSICAL THERAPY | Age: 66
Setting detail: THERAPIES SERIES
Discharge: HOME | End: 2021-01-04
Payer: COMMERCIAL

## 2021-01-04 PROCEDURE — 97110 THERAPEUTIC EXERCISES: CPT

## 2021-01-04 PROCEDURE — 97140 MANUAL THERAPY 1/> REGIONS: CPT

## 2021-01-04 NOTE — FLOWSHEET NOTE
Physical Therapy Daily Treatment Note  Date:  2021    Patient Name:  Andre Ivy    :  1955  MRN: [id number]    Restrictions/Precautions:  Restrictions/Precautions  Restrictions/Precautions: Fall Risk(Low)  Position Activity Restriction  Other position/activity restrictions: Rotator Cuff Repair Protocol  Sling:  Medium tear:  4 wks at all times, then 2 wks for activities  DOS:  20         Motion:  POD 1 (20):  Pendulum exercises, elbow/wrist/hand ROM  POD 14 (20): Supine PROM:  Flex to 130, abd to 40, ER to tolerance w/45 degree abd Start stationary bike  POD 21 (20):  P/AAROM:  Flex to 150, abd to 70, ER to tolerance with 45 degree abd  POD 42 (20):  P/AAROM:  Flex to 180, abd and ER to tolerance                  Begin active ROM, progress to full AROM as comfort allows    Strengthenin wk (Only small/medium tears): Gentle strengthening, isometrics, pain-free  10 wks:  Initiate full strengthening program; theraband ER/IR, side-lying ER;                Deltoid lateral raise; Elyssa-scapular strengthening; ;Progressive resistance  Pertinent Medical History:      Medical/Treatment Diagnosis Information:  · Diagnosis: s/p Left Rotator Cuff Repair Z98.890  · Treatment Diagnosis: s/p L Rotator Cuff Repair, L shoulder Pain, Decreased Functional Mobility    Insurance/Certification information:  PT Insurance Information: [address] [place] [address], allowed 30 PT visits per year, no prior authorization required  Physician Information:  Referring Practitioner: Bartolome Montana MD  Plan of care signed (Y/N): Cosign received     Visit# / total visits:    Pain level: 0/10 at best, 3-4/10 at worst     Functional Outcomes Measure:  Test: QuickDASH  Score:21 = 22.73% Disability  (20)    Progress Note: [x]  Yes  []  No  Next due by: Visit #10      History of Injury:Patient reports a history of Left shoulder pain x 1 year. \"If I did anything physical with my arm it would hurt. I couldn't sleep some nights\"  Patient saw Dr. Jace De Luna, had x-ray, MRI, RTC tear, 2 bone spurs, arthritis in shoulder. Patient underwent arthroscopic RTC repair, Juno, [facility] Patient reports  pain primarilly after block wore off, but has improved. Patient has used pain medication and/or ibuprofen prn, sparingly. Patient is not able to sleep, patient is using R UE for ADLs. Patient is limiting activities due to L shoulder. patient is off work at present. Subjective:   Patient reports shoulder is \"stiff and tight\" after driving 16 hours from [place] over the weekend. Objective:   Observation:    Test measurements:    DATE:                              12/7/20   PROM AROM AROM    (Left) (Left) Right   Flexion 130 100 165   Extension 50 45 65   Abduction 100 90 165   Internal Rotation 70 60 80   External Rotation 30 20 70           Exercises:  Exercise/Equipment Resistance/Repetitions Other comments        Pulley flexion 20x sitting   Gym Ball on Table Green, 1 x 15 reps, flexion  Green, 1 x 15, scaption standing   UE [place] Flexion, 1 x 10 reps standing        Theraslide Lat Pull Down, 1 x 10 reps, green  Row, 1 x 10 reps, green  Ext, 1 x 10 reps, green  IR, 1 x 10 reps, yellow  ER, 1 x 10 reps, yellow standing                                              Other Therapeutic Activities:  11/11/20:  Discussed at length anatomy and biomechanics of the shoulder, muscle reeducation, motor recruitment. Home Exercise Program:  11/11/20:   Patient instructed in lower trap sets, Codman's pendulum, elbow flex/ext, pron/sup, wrist flex/ext, fist ; written instructions with pictures issued, patient able to demonstrate exercises. 11/23/20:   Patient instructed in flexion and scaption table slides written instructions with pictures issued, patient able to demonstrate exercises.   11/25/20:   Patient instructed in isometric shoulder flex, ext ; written instructions with pictures issued, patient able to demonstrate exercises. 11/30/20: isometric shoulder abd and add  12/10/20:   Patient instructed in isometric IR, ER ; written instructions with pictures issued, patient able to demonstrate exercises. 12/14/20:   Patient instructed in doorway stretch ; written instructions with pictures issued, patient able to demonstrate exercises. 12/21/20:   Patient instructed in wall slide with step flex and scaption ; written instructions with pictures issued, patient able to demonstrate exercises. 12/24/20 theraband lat pulls, rows, IR, ER, shoulder extension, fencing    Manual Treatments:  Soft tissue mobilization; passive stretching to 150 flex, 140 scaption, 40 ER    Modalities:     Progression Towards Functional goals:  [x] Patient is progressing as expected towards functional goals listed. [] Progression is slowed due to complexities listed. [] Progression has been slowed due to co-morbidities. [] Plan just implemented, too soon to assess goals progression  [] Other:    Charges: Therapeutic Exercise:  [x] (50815) Provided verbal/tactile cueing for activities to restore or maintain strength, flexibility, endurance, ROM for improvements with self-care, mobility, lifting and ambulation. Neuromuscular Re-Education  [] (53844) Provided verbal/tactile cueing for activities to restore or maintain balance, coordination, kinesthetic sense, posture, motor skill, proprioception for self-care, mobility, lifting, and ambulation. Therapeutic Activities:    [] (07377) Provided verbal/tactile cueing to address functional limitations related to loss of mobility, strength, balance, and coordination.      Gait Training:  [] (60903) Provided training and instruction to the patient for proper postural muscle recruitment and positioning with ambulation re-education     Home Exercise Program:    [] (87209) Reviewed/Progressed HEP activities related to strengthening, flexibility, endurance, ROM for functional self-care, mobility, lifting and ambulation   [] (86017) Reviewed/Progressed HEP activities related to improving balance, coordination, kinesthetic sense, posture, motor skill, proprioception for self-care, mobility, lifting, and ambulation      Manual Treatments:  MFR / STM / Oscillations-Mobs:  G-I, II, III, IV / Manipulation / MLD  [x] (94532) Provided manual therapy to mobilize  soft tissue/joints/fluid for the purpose of modulating pain, promoting relaxation, increasing ROM, reducing/eliminating soft tissue swelling/inflammation/restriction, improving soft tissue extensibility and allowing for proper ROM for normal function with self- care, mobility, lifting and ambulation.         Timed Code Treatment Minutes: 45   Total Treatment Minutes: 45     [] EVAL (LOW) 86159   [] EVAL (MOD) 29014   [] EVAL (HIGH) 26157   [] RE-EVAL   [x] TE (48881) x     [] Aquatic (49963) x  [] NMR (81533)   x  [] Aquatic Group (32522) x  [x] Manual (42903) x  2  [] Ultrasound (81355) x  [] TA (65079) x  [] Mech Traction (22830)  [] Ionto (12222)           [] ES (un) (35824):   [] Vasopump (80159) [] Other:      Assessment  [x] Patient tolerated treatment well [] Patient limited by fatigue  [] Patient limited by pain  [] Patient limited by other medical complications  [] Other:     Prognosis: [x] Good [] Fair  [] Poor    Goals:    Short term goals  Time Frame for Short term goals: 4-6 weeks  Short term goal 1: Pain </= 0/10 at rest, 2/10 with activity  Improved but not met  Short term goal 2: Patient able to demonstrate AROM L shoulder WFL  Improved but not met  Short term goal 3: Patient able to demonstrate strength L shoulder >/= 3+/5  Improved but not met  Short term goal 4: Patient able to perform light chores without increased symptoms  Improved but not met  Short term goal 5: Patient independent with written home exercise program Ongoing           Patient Requires Follow-up: [x] Yes  [] No    Plan:   [x] Continue per plan of care [] Alter current plan (see comments)  [] Plan of care initiated [] Hold pending MD visit [] Discharge  Note: If patient does not return for scheduled/ recommended follow up visits, this note will serve as a discharge from care along with most recent update on progress. Plan for Next Session:  Monitor response. Soft tissue mob, PROM L shoulder. Instruct patient in theraband ex for HEP.      Electronically signed by:  Betty Shanks, [address] [facility]

## 2021-01-06 ENCOUNTER — HOSPITAL ENCOUNTER (OUTPATIENT)
Dept: PHYSICAL THERAPY | Age: 66
Setting detail: THERAPIES SERIES
Discharge: HOME | End: 2021-01-06
Payer: COMMERCIAL

## 2021-01-06 PROCEDURE — 97110 THERAPEUTIC EXERCISES: CPT

## 2021-01-06 PROCEDURE — 97140 MANUAL THERAPY 1/> REGIONS: CPT

## 2021-01-06 NOTE — FLOWSHEET NOTE
Physical Therapy Daily Treatment Note  Date:  2021    Patient Name:  Mauri Betts    :  1955  MRN: [id number]    Restrictions/Precautions:  Restrictions/Precautions  Restrictions/Precautions: Fall Risk(Low)  Position Activity Restriction  Other position/activity restrictions: Rotator Cuff Repair Protocol  Sling:  Medium tear:  4 wks at all times, then 2 wks for activities  DOS:  20         Motion:  POD 1 (20):  Pendulum exercises, elbow/wrist/hand ROM  POD 14 (20): Supine PROM:  Flex to 130, abd to 40, ER to tolerance w/45 degree abd Start stationary bike  POD 21 (20):  P/AAROM:  Flex to 150, abd to 70, ER to tolerance with 45 degree abd  POD 42 (20):  P/AAROM:  Flex to 180, abd and ER to tolerance                  Begin active ROM, progress to full AROM as comfort allows    Strengthenin wk (Only small/medium tears): Gentle strengthening, isometrics, pain-free  10 wks:  Initiate full strengthening program; theraband ER/IR, side-lying ER;                Deltoid lateral raise; Elyssa-scapular strengthening; ;Progressive resistance  Pertinent Medical History:      Medical/Treatment Diagnosis Information:  · Diagnosis: s/p Left Rotator Cuff Repair Z98.890  · Treatment Diagnosis: s/p L Rotator Cuff Repair, L shoulder Pain, Decreased Functional Mobility    Insurance/Certification information:  PT Insurance Information: [address] [place] [address], allowed 30 PT visits per year, no prior authorization required  Physician Information:  Referring Practitioner: Janette Delatorre MD  Plan of care signed (Y/N): Cosign received     Visit# / total visits:    Pain level: 0/10 at best, 3-4/10 at worst     Functional Outcomes Measure:  Test: QuickDASH  Score:21 = 22.73% Disability  (20)    Progress Note: [x]  Yes  []  No  Next due by: Visit #10      History of Injury:Patient reports a history of Left shoulder pain x 1 year. \"If I did anything physical with my arm it would hurt. I couldn't sleep some nights\"  Patient saw Dr. Satya Garcia, had x-ray, MRI, RTC tear, 2 bone spurs, arthritis in shoulder. Patient underwent arthroscopic RTC repair, Juno, chondroplasty, labral [facility]bridement at [facility] on 11/4/20. Patient reports  pain primarilly after block wore off, but has improved. Patient has used pain medication and/or ibuprofen prn, sparingly. Patient is not able to sleep, patient is using R UE for ADLs. Patient is limiting activities due to L shoulder. patient is off work at present. Subjective:   Looser today than the other day. Tightened back up a little bit. Getting better everyday, though  Objective:   Observation:    Test measurements:    DATE:                              12/7/20   PROM AROM AROM    (Left) (Left) Right   Flexion 130 100 165   Extension 50 45 65   Abduction 100 90 165   Internal Rotation 70 60 80   External Rotation 30 20 70           Exercises:  Exercise/Equipment Resistance/Repetitions Other comments        Pulley flexion 20x sitting   Gym Ball on Table Green, 1 x 15 reps, flexion  Green, 1 x 15, scaption standing   UE [place] Flexion, 1 x 10 reps standing        Theraslide Lat Pull Down, 1 x 10 reps, green  Row, 1 x 10 reps, green  Ext, 1 x 10 reps, green  IR, 1 x 10 reps, yellow  ER, 1 x 10 reps, yellow standing                                              Other Therapeutic Activities:  11/11/20:  Discussed at length anatomy and biomechanics of the shoulder, muscle reeducation, motor recruitment. Home Exercise Program:  11/11/20:   Patient instructed in lower trap sets, Codman's pendulum, elbow flex/ext, pron/sup, wrist flex/ext, fist ; written instructions with pictures issued, patient able to demonstrate exercises. 11/23/20:   Patient instructed in flexion and scaption table slides written instructions with pictures issued, patient able to demonstrate exercises.   11/25/20:   Patient instructed in isometric shoulder flex, ext ; written instructions with pictures issued, patient able to demonstrate exercises. 11/30/20: isometric shoulder abd and add  12/10/20:   Patient instructed in isometric IR, ER ; written instructions with pictures issued, patient able to demonstrate exercises. 12/14/20:   Patient instructed in doorway stretch ; written instructions with pictures issued, patient able to demonstrate exercises. 12/21/20:   Patient instructed in wall slide with step flex and scaption ; written instructions with pictures issued, patient able to demonstrate exercises. 12/24/20 theraband lat pulls, rows, IR, ER, shoulder extension, fencing    Manual Treatments:  Soft tissue mobilization; passive stretching to 150 flex, 140 scaption, 40 ER    Modalities:     Progression Towards Functional goals:  [x] Patient is progressing as expected towards functional goals listed. [] Progression is slowed due to complexities listed. [] Progression has been slowed due to co-morbidities. [] Plan just implemented, too soon to assess goals progression  [] Other:    Charges: Therapeutic Exercise:  [x] (24867) Provided verbal/tactile cueing for activities to restore or maintain strength, flexibility, endurance, ROM for improvements with self-care, mobility, lifting and ambulation. Neuromuscular Re-Education  [] (17699) Provided verbal/tactile cueing for activities to restore or maintain balance, coordination, kinesthetic sense, posture, motor skill, proprioception for self-care, mobility, lifting, and ambulation. Therapeutic Activities:    [] (43297) Provided verbal/tactile cueing to address functional limitations related to loss of mobility, strength, balance, and coordination.      Gait Training:  [] (25506) Provided training and instruction to the patient for proper postural muscle recruitment and positioning with ambulation re-education     Home Exercise Program:    [] (74569) Reviewed/Progressed HEP activities related to strengthening, flexibility, endurance, ROM for functional self-care, mobility, lifting and ambulation   [] (48658) Reviewed/Progressed HEP activities related to improving balance, coordination, kinesthetic sense, posture, motor skill, proprioception for self-care, mobility, lifting, and ambulation      Manual Treatments:  MFR / STM / Oscillations-Mobs:  G-I, II, III, IV / Manipulation / MLD  [x] (20531) Provided manual therapy to mobilize  soft tissue/joints/fluid for the purpose of modulating pain, promoting relaxation, increasing ROM, reducing/eliminating soft tissue swelling/inflammation/restriction, improving soft tissue extensibility and allowing for proper ROM for normal function with self- care, mobility, lifting and ambulation.         Timed Code Treatment Minutes: 45   Total Treatment Minutes: 45     [] EVAL (LOW) 66248   [] EVAL (MOD) 03688   [] EVAL (HIGH) 43015   [] RE-EVAL   [x] TE (17791) x     [] Aquatic (93456) x  [] NMR (03249)   x  [] Aquatic Group (64969) x  [x] Manual (98393) x  2  [] Ultrasound (75513) x  [] TA (43113) x  [] Mech Traction (93834)  [] Ionto (63536)           [] ES (un) (57173):   [] Vasopump (04386) [] Other:      Assessment  [x] Patient tolerated treatment well [] Patient limited by fatigue  [] Patient limited by pain  [] Patient limited by other medical complications  [] Other:     Prognosis: [x] Good [] Fair  [] Poor    Goals:    Short term goals  Time Frame for Short term goals: 4-6 weeks  Short term goal 1: Pain </= 0/10 at rest, 2/10 with activity  Improved but not met  Short term goal 2: Patient able to demonstrate AROM L shoulder WFL  Improved but not met  Short term goal 3: Patient able to demonstrate strength L shoulder >/= 3+/5  Improved but not met  Short term goal 4: Patient able to perform light chores without increased symptoms  Improved but not met  Short term goal 5: Patient independent with written home exercise program Ongoing           Patient Requires Follow-up: [x] Yes  [] No    Plan:   [x] Continue per plan of care [] Alter current plan (see comments)  [] Plan of care initiated [] Hold pending MD visit [] Discharge  Note: If patient does not return for scheduled/ recommended follow up visits, this note will serve as a discharge from care along with most recent update on progress. Plan for Next Session:  Monitor response. Soft tissue mob, PROM L shoulder.   Electronically signed by:  Paola Mills PTA

## 2021-01-08 ENCOUNTER — HOSPITAL ENCOUNTER (OUTPATIENT)
Dept: PHYSICAL THERAPY | Age: 66
Setting detail: THERAPIES SERIES
Discharge: HOME | End: 2021-01-08
Payer: COMMERCIAL

## 2021-01-08 PROCEDURE — 97140 MANUAL THERAPY 1/> REGIONS: CPT

## 2021-01-08 PROCEDURE — 97110 THERAPEUTIC EXERCISES: CPT

## 2021-01-08 NOTE — FLOWSHEET NOTE
Physical Therapy Daily Treatment Note  Date:  2021    Patient Name:  Rosmery Carreno    :  1955  MRN: [id number]    Restrictions/Precautions:  Restrictions/Precautions  Restrictions/Precautions: Fall Risk(Low)  Position Activity Restriction  Other position/activity restrictions: Rotator Cuff Repair Protocol  Sling:  Medium tear:  4 wks at all times, then 2 wks for activities  DOS:  20         Motion:  POD 1 (20):  Pendulum exercises, elbow/wrist/hand ROM  POD 14 (20): Supine PROM:  Flex to 130, abd to 40, ER to tolerance w/45 degree abd Start stationary bike  POD 21 (20):  P/AAROM:  Flex to 150, abd to 70, ER to tolerance with 45 degree abd  POD 42 (20):  P/AAROM:  Flex to 180, abd and ER to tolerance                  Begin active ROM, progress to full AROM as comfort allows    Strengthenin wk (Only small/medium tears): Gentle strengthening, isometrics, pain-free  10 wks:  Initiate full strengthening program; theraband ER/IR, side-lying ER;                Deltoid lateral raise; Elyssa-scapular strengthening; ;Progressive resistance  Pertinent Medical History:      Medical/Treatment Diagnosis Information:  · Diagnosis: s/p Left Rotator Cuff Repair Z98.890  · Treatment Diagnosis: s/p L Rotator Cuff Repair, L shoulder Pain, Decreased Functional Mobility    Insurance/Certification information:  PT Insurance Information: [address] [place] [address], allowed 30 PT visits per year, no prior authorization required  Physician Information:  Referring Practitioner: Blkae Barros MD  Plan of care signed (Y/N): Cosign received     Visit# / total visits:    Pain level: 0/10 at best, 3-4/10 at worst     Functional Outcomes Measure:  Test: QuickDASH  Score:21 = 22.73% Disability  (20)    Progress Note: [x]  Yes  []  No  Next due by: Visit #10      History of Injury:Patient reports a history of Left shoulder pain x 1 year. \"If I did anything physical with my arm it would hurt. I couldn't sleep some nights\"  Patient saw Dr. Satya Garcia, had x-ray, MRI, RTC tear, 2 bone spurs, arthritis in shoulder. Patient underwent arthroscopic RTC repair, Juno, chondroplasty, labral debridement at [facility] on 11/4/20. Patient reports  pain primarilly after block wore off, but has improved. Patient has used pain medication and/or ibuprofen prn, sparingly. Patient is not able to sleep, patient is using R UE for ADLs. Patient is limiting activities due to L shoulder. patient is off work at present. Subjective:   Looser today than the other day. Tightened back up a little bit. Getting better everyday, though  1/8/21: stiff, but is every morning. Did well after last session  Objective:   Observation:    Test measurements:    DATE:                              12/7/20   PROM AROM AROM    (Left) (Left) Right   Flexion 130 100 165   Extension 50 45 65   Abduction 100 90 165   Internal Rotation 70 60 80   External Rotation 30 20 70           Exercises:  Exercise/Equipment Resistance/Repetitions Other comments        Pulley flexion 20x sitting   Gym Ball on Table Green, 1 x 15 reps, flexion  Green, 1 x 15, scaption standing   UE [place] Flexion, 1 x 15 reps standing        Theraslide Lat Pull Down, 1 x 10 reps, green  Row, 1 x 10 reps, green  Ext, 1 x 10 reps, green  IR, 1 x 10 reps, yellow  ER, 1 x 10 reps, yellow standing                                              Other Therapeutic Activities:  11/11/20:  Discussed at length anatomy and biomechanics of the shoulder, muscle reeducation, motor recruitment. Home Exercise Program:  11/11/20:   Patient instructed in lower trap sets, Codman's pendulum, elbow flex/ext, pron/sup, wrist flex/ext, fist ; written instructions with pictures issued, patient able to demonstrate exercises. 11/23/20:   Patient instructed in flexion and scaption table slides written instructions with pictures issued, patient able to demonstrate exercises.   11/25/20:   Patient instructed in isometric shoulder flex, ext ; written instructions with pictures issued, patient able to demonstrate exercises. 11/30/20: isometric shoulder abd and add  12/10/20:   Patient instructed in isometric IR, ER ; written instructions with pictures issued, patient able to demonstrate exercises. 12/14/20:   Patient instructed in doorway stretch ; written instructions with pictures issued, patient able to demonstrate exercises. 12/21/20:   Patient instructed in wall slide with step flex and scaption ; written instructions with pictures issued, patient able to demonstrate exercises. 12/24/20 theraband lat pulls, rows, IR, ER, shoulder extension, fencing    Manual Treatments:  Soft tissue mobilization; passive stretching to 150 flex, 140 scaption, 40 ER    Modalities:     Progression Towards Functional goals:  [x] Patient is progressing as expected towards functional goals listed. [] Progression is slowed due to complexities listed. [] Progression has been slowed due to co-morbidities. [] Plan just implemented, too soon to assess goals progression  [] Other:    Charges: Therapeutic Exercise:  [x] (25608) Provided verbal/tactile cueing for activities to restore or maintain strength, flexibility, endurance, ROM for improvements with self-care, mobility, lifting and ambulation. Neuromuscular Re-Education  [] (94389) Provided verbal/tactile cueing for activities to restore or maintain balance, coordination, kinesthetic sense, posture, motor skill, proprioception for self-care, mobility, lifting, and ambulation. Therapeutic Activities:    [] (86499) Provided verbal/tactile cueing to address functional limitations related to loss of mobility, strength, balance, and coordination.      Gait Training:  [] (89574) Provided training and instruction to the patient for proper postural muscle recruitment and positioning with ambulation re-education     Home Exercise Program:    [] (82740) Reviewed/Progressed HEP activities related to strengthening, flexibility, endurance, ROM for functional self-care, mobility, lifting and ambulation   [] (47742) Reviewed/Progressed HEP activities related to improving balance, coordination, kinesthetic sense, posture, motor skill, proprioception for self-care, mobility, lifting, and ambulation      Manual Treatments:  MFR / STM / Oscillations-Mobs:  G-I, II, III, IV / Manipulation / MLD  [x] (69971) Provided manual therapy to mobilize  soft tissue/joints/fluid for the purpose of modulating pain, promoting relaxation, increasing ROM, reducing/eliminating soft tissue swelling/inflammation/restriction, improving soft tissue extensibility and allowing for proper ROM for normal function with self- care, mobility, lifting and ambulation.         Timed Code Treatment Minutes: 45   Total Treatment Minutes: 50     [] EVAL (LOW) 73783   [] EVAL (MOD) 54086   [] EVAL (HIGH) 51248   [] RE-EVAL   [x] TE (32957) x     [] Aquatic (73233) x  [] NMR (45777)   x  [] Aquatic Group (01957) x  [x] Manual (56877) x  2  [] Ultrasound (86025) x  [] TA (36462) x  [] Mech Traction (18120)  [] Ionto (90638)           [] ES (un) (48396):   [] Vasopump (48530) [] Other:      Assessment  [x] Patient tolerated treatment well [] Patient limited by fatigue  [] Patient limited by pain  [] Patient limited by other medical complications  [] Other:     Prognosis: [x] Good [] Fair  [] Poor    Goals:    Short term goals  Time Frame for Short term goals: 4-6 weeks  Short term goal 1: Pain </= 0/10 at rest, 2/10 with activity  Improved but not met  Short term goal 2: Patient able to demonstrate AROM L shoulder WFL  Improved but not met  Short term goal 3: Patient able to demonstrate strength L shoulder >/= 3+/5  Improved but not met  Short term goal 4: Patient able to perform light chores without increased symptoms  Improved but not met  Short term goal 5: Patient independent with written home exercise program Ongoing Patient Requires Follow-up: [x] Yes  [] No    Plan:   [x] Continue per plan of care [] Alter current plan (see comments)  [] Plan of care initiated [] Hold pending MD visit [] Discharge  Note: If patient does not return for scheduled/ recommended follow up visits, this note will serve as a discharge from care along with most recent update on progress. Plan for Next Session:  Monitor response. Soft tissue mob, PROM L shoulder.   Electronically signed by:  Vandana Dash PTA

## 2021-01-11 ENCOUNTER — HOSPITAL ENCOUNTER (OUTPATIENT)
Dept: PHYSICAL THERAPY | Age: 66
Setting detail: THERAPIES SERIES
Discharge: HOME | End: 2021-01-11
Payer: COMMERCIAL

## 2021-01-11 PROCEDURE — 97140 MANUAL THERAPY 1/> REGIONS: CPT

## 2021-01-11 PROCEDURE — 97110 THERAPEUTIC EXERCISES: CPT

## 2021-01-11 NOTE — FLOWSHEET NOTE
Physical Therapy Daily Treatment Note  Date:  2021    Patient Name:  Rere Olson    :  1955  MRN: [id number]    Restrictions/Precautions:  Restrictions/Precautions  Restrictions/Precautions: Fall Risk(Low)  Position Activity Restriction  Other position/activity restrictions: Rotator Cuff Repair Protocol  Sling:  Medium tear:  4 wks at all times, then 2 wks for activities  DOS:  20         Motion:  POD 1 (20):  Pendulum exercises, elbow/wrist/hand ROM  POD 14 (20): Supine PROM:  Flex to 130, abd to 40, ER to tolerance w/45 degree abd Start stationary bike  POD 21 (20):  P/AAROM:  Flex to 150, abd to 70, ER to tolerance with 45 degree abd  POD 42 (20):  P/AAROM:  Flex to 180, abd and ER to tolerance                  Begin active ROM, progress to full AROM as comfort allows    Strengthenin wk (Only small/medium tears): Gentle strengthening, isometrics, pain-free  10 wks:  Initiate full strengthening program; theraband ER/IR, side-lying ER;                Deltoid lateral raise; Elyssa-scapular strengthening; ;Progressive resistance  Pertinent Medical History:      Medical/Treatment Diagnosis Information:  · Diagnosis: s/p Left Rotator Cuff Repair Z98.890  · Treatment Diagnosis: s/p L Rotator Cuff Repair, L shoulder Pain, Decreased Functional Mobility    Insurance/Certification information:  PT Insurance Information: [address] [place] [address], allowed 30 PT visits per year, no prior authorization required  Physician Information:  Referring Practitioner: Satya Mak MD  Plan of care signed (Y/N): Marlene received     Visit# / total visits:    Pain level: 0/10 at best, 3-4/10 at worst     Functional Outcomes Measure:  Test: QuickDASH  Score:21 = 22.73% Disability  (20)    Progress Note: [x]  Yes  []  No  Next due by: Visit #10      History of Injury:Patient reports a history of Left shoulder pain x 1 year. \"If I did anything physical with my arm it would hurt. I couldn't sleep some nights\"  Patient saw Dr. Norma Abreu, had x-ray, MRI, RTC tear, 2 bone spurs, arthritis in shoulder. Patient underwent arthroscopic RTC repair, Juno, chondroplasty, labral debridement at [facility] on 11/4/20. Patient reports  pain primarilly after block wore off, but has improved. Patient has used pain medication and/or ibuprofen prn, sparingly. Patient is not able to sleep, patient is using R UE for ADLs. Patient is limiting activities due to L shoulder. patient is off work at present. Subjective:   Patient reports shoulder is doing better, not as stiff. Objective:   Observation:    Test measurements:    DATE:                              12/7/20   PROM AROM AROM    (Left) (Left) Right   Flexion 130 100 165   Extension 50 45 65   Abduction 100 90 165   Internal Rotation 70 60 80   External Rotation 30 20 70           Exercises:  Exercise/Equipment Resistance/Repetitions Other comments        Pulley flexion 20x sitting   Gym Ball on Table Green, 1 x 15 reps, flexion  Green, 1 x 15, scaption standing   UE [place] Flexion, 1 x 15 reps standing        Theraslide Lat Pull Down, 1 x 10 reps, green  Row, 1 x 10 reps, green  Ext, 1 x 10 reps, green  Fencing, 1 x 10 reps, green  IR, 1 x 10 reps, yellow  ER, 1 x 10 reps, yellow standing                                              Other Therapeutic Activities:  11/11/20:  Discussed at length anatomy and biomechanics of the shoulder, muscle reeducation, motor recruitment. Home Exercise Program:  11/11/20:   Patient instructed in lower trap sets, Codman's pendulum, elbow flex/ext, pron/sup, wrist flex/ext, fist ; written instructions with pictures issued, patient able to demonstrate exercises. 11/23/20:   Patient instructed in flexion and scaption table slides written instructions with pictures issued, patient able to demonstrate exercises.   11/25/20:   Patient instructed in isometric shoulder flex, ext ; written instructions with pictures issued, patient able to demonstrate exercises. 11/30/20: isometric shoulder abd and add  12/10/20:   Patient instructed in isometric IR, ER ; written instructions with pictures issued, patient able to demonstrate exercises. 12/14/20:   Patient instructed in doorway stretch ; written instructions with pictures issued, patient able to demonstrate exercises. 12/21/20:   Patient instructed in wall slide with step flex and scaption ; written instructions with pictures issued, patient able to demonstrate exercises. 12/24/20 theraband lat pulls, rows, IR, ER, shoulder extension, fencing    Manual Treatments:  Soft tissue mobilization; passive stretching to 150 flex, 140 scaption, 40 ER    Modalities:     Progression Towards Functional goals:  [x] Patient is progressing as expected towards functional goals listed. [] Progression is slowed due to complexities listed. [] Progression has been slowed due to co-morbidities. [] Plan just implemented, too soon to assess goals progression  [] Other:    Charges: Therapeutic Exercise:  [x] (03959) Provided verbal/tactile cueing for activities to restore or maintain strength, flexibility, endurance, ROM for improvements with self-care, mobility, lifting and ambulation. Neuromuscular Re-Education  [] (34415) Provided verbal/tactile cueing for activities to restore or maintain balance, coordination, kinesthetic sense, posture, motor skill, proprioception for self-care, mobility, lifting, and ambulation. Therapeutic Activities:    [] (08980) Provided verbal/tactile cueing to address functional limitations related to loss of mobility, strength, balance, and coordination.      Gait Training:  [] (48876) Provided training and instruction to the patient for proper postural muscle recruitment and positioning with ambulation re-education     Home Exercise Program:    [] (70985) Reviewed/Progressed HEP activities related to strengthening, flexibility, endurance, ROM for functional self-care, mobility, lifting and ambulation   [] (11250) Reviewed/Progressed HEP activities related to improving balance, coordination, kinesthetic sense, posture, motor skill, proprioception for self-care, mobility, lifting, and ambulation      Manual Treatments:  MFR / STM / Oscillations-Mobs:  G-I, II, III, IV / Manipulation / MLD  [x] (57595) Provided manual therapy to mobilize  soft tissue/joints/fluid for the purpose of modulating pain, promoting relaxation, increasing ROM, reducing/eliminating soft tissue swelling/inflammation/restriction, improving soft tissue extensibility and allowing for proper ROM for normal function with self- care, mobility, lifting and ambulation.         Timed Code Treatment Minutes: 45   Total Treatment Minutes: 50     [] EVAL (LOW) 74876   [] EVAL (MOD) 56572   [] EVAL (HIGH) 15391   [] RE-EVAL   [x] TE (19638) x     [] Aquatic (58002) x  [] NMR (55640)   x  [] Aquatic Group (96949) x  [x] Manual (50915) x  2  [] Ultrasound (83860) x  [] TA (57231) x  [] Mech Traction (64674)  [] Ionto (16228)           [] ES (un) (00839):   [] Vasopump (82143) [] Other:      Assessment  [x] Patient tolerated treatment well [] Patient limited by fatigue  [] Patient limited by pain  [] Patient limited by other medical complications  [] Other:     Prognosis: [x] Good [] Fair  [] Poor    Goals:    Short term goals  Time Frame for Short term goals: 4-6 weeks  Short term goal 1: Pain </= 0/10 at rest, 2/10 with activity  Improved but not met  Short term goal 2: Patient able to demonstrate AROM L shoulder WFL  Improved but not met  Short term goal 3: Patient able to demonstrate strength L shoulder >/= 3+/5  Improved but not met  Short term goal 4: Patient able to perform light chores without increased symptoms  Improved but not met  Short term goal 5: Patient independent with written home exercise program Ongoing           Patient Requires Follow-up: [x] Yes  [] No    Plan: Initiate green theraband with IR, ER next visit. Initiate wall push up for HEP if able  [x] Continue per plan of care [] Alter current plan (see comments)  [] Plan of care initiated [] Hold pending MD visit [] Discharge  Note: If patient does not return for scheduled/ recommended follow up visits, this note will serve as a discharge from care along with most recent update on progress. Plan for Next Session:  Monitor response. Soft tissue mob, PROM L shoulder.   Electronically signed by:  Juan José Kenyon, [address] [facility]

## 2021-01-13 ENCOUNTER — HOSPITAL ENCOUNTER (OUTPATIENT)
Dept: PHYSICAL THERAPY | Age: 66
Setting detail: THERAPIES SERIES
Discharge: HOME | End: 2021-01-13
Payer: COMMERCIAL

## 2021-01-13 PROCEDURE — 97140 MANUAL THERAPY 1/> REGIONS: CPT

## 2021-01-13 PROCEDURE — 97110 THERAPEUTIC EXERCISES: CPT

## 2021-01-13 NOTE — FLOWSHEET NOTE
Physical Therapy Daily Treatment Note  Date:  2021    Patient Name:  Anton Coburn    :  1955  MRN: [id number]    Restrictions/Precautions:  Restrictions/Precautions  Restrictions/Precautions: Fall Risk(Low)  Position Activity Restriction  Other position/activity restrictions: Rotator Cuff Repair Protocol  Sling:  Medium tear:  4 wks at all times, then 2 wks for activities  DOS:  20         Motion:  POD 1 (20):  Pendulum exercises, elbow/wrist/hand ROM  POD 14 (20): Supine PROM:  Flex to 130, abd to 40, ER to tolerance w/45 degree abd Start stationary bike  POD 21 (20):  P/AAROM:  Flex to 150, abd to 70, ER to tolerance with 45 degree abd  POD 42 (20):  P/AAROM:  Flex to 180, abd and ER to tolerance                  Begin active ROM, progress to full AROM as comfort allows    Strengthenin wk (Only small/medium tears): Gentle strengthening, isometrics, pain-free  10 wks:  Initiate full strengthening program; theraband ER/IR, side-lying ER;                Deltoid lateral raise; Elyssa-scapular strengthening; ;Progressive resistance  Pertinent Medical History:      Medical/Treatment Diagnosis Information:  · Diagnosis: s/p Left Rotator Cuff Repair Z98.890  · Treatment Diagnosis: s/p L Rotator Cuff Repair, L shoulder Pain, Decreased Functional Mobility    Insurance/Certification information:  PT Insurance Information: [address] [place] [address], allowed 30 PT visits per year, no prior authorization required  Physician Information:  Referring Practitioner: Richar Sinha MD  Plan of care signed (Y/N): Cosign received     Visit# / total visits:    Pain level: 0/10 at best, 2-3/10 at worst     Functional Outcomes Measure:  Test: QuickDASH  Score:21 = 22.73% Disability  (20)    Progress Note: [x]  Yes  []  No  Next due by: Visit #10      History of Injury:Patient reports a history of Left shoulder pain x 1 year. \"If I did anything physical with my arm it would hurt. I couldn't sleep some nights\"  Patient saw Dr. Satya Garcia, had x-ray, MRI, RTC tear, 2 bone spurs, arthritis in shoulder. Patient underwent arthroscopic RTC repair, Juno, chondroplasty, labral debridement at [facility] on 11/4/20. Patient reports  pain primarilly after block wore off, but has improved. Patient has used pain medication and/or ibuprofen prn, sparingly. Patient is not able to sleep, patient is using R UE for ADLs. Patient is limiting activities due to L shoulder. patient is off work at present. Subjective:   Patient reports shoulder is doing pretty good overall, still has difficulty reaching high. Objective:   Observation:    Test measurements:    DATE:                              12/7/20   PROM AROM AROM    (Left) (Left) Right   Flexion 130 100 165   Extension 50 45 65   Abduction 100 90 165   Internal Rotation 70 60 80   External Rotation 30 20 70           Exercises:  Exercise/Equipment Resistance/Repetitions Other comments        Pulley flexion 20x sitting   Gym Ball on Table Green, 1 x 15 reps, flexion  Green, 1 x 15, scaption standing   UE [place] Flexion, 1 x 15 reps standing        Theraslide Lat Pull Down, 1 x 10 reps, green  Row, 1 x 10 reps, green  Ext, 1 x 10 reps, green  Fencing, 1 x 10 reps, green  IR, 1 x 10 reps, green  ER, 1 x 10 reps, green standing                                              Other Therapeutic Activities:  11/11/20:  Discussed at length anatomy and biomechanics of the shoulder, muscle reeducation, motor recruitment. Home Exercise Program:  11/11/20:   Patient instructed in lower trap sets, Codman's pendulum, elbow flex/ext, pron/sup, wrist flex/ext, fist ; written instructions with pictures issued, patient able to demonstrate exercises. 11/23/20:   Patient instructed in flexion and scaption table slides written instructions with pictures issued, patient able to demonstrate exercises.   11/25/20:   Patient instructed in isometric shoulder flex, ext ; 5: Patient independent with written home exercise program Ongoing           Patient Requires Follow-up: [x] Yes  [] No    Plan: Focus on increasing ROM  [x] Continue per plan of care [] Alter current plan (see comments)  [] Plan of care initiated [] Hold pending MD visit [] Discharge  Note: If patient does not return for scheduled/ recommended follow up visits, this note will serve as a discharge from care along with most recent update on progress. Plan for Next Session:  Monitor response. Soft tissue mob, PROM L shoulder.   Electronically signed by:  Opal Welch, [address] [facility]

## 2021-01-15 ENCOUNTER — HOSPITAL ENCOUNTER (OUTPATIENT)
Dept: PHYSICAL THERAPY | Age: 66
Setting detail: THERAPIES SERIES
Discharge: HOME | End: 2021-01-15
Payer: COMMERCIAL

## 2021-01-15 PROCEDURE — 97110 THERAPEUTIC EXERCISES: CPT

## 2021-01-15 PROCEDURE — 97140 MANUAL THERAPY 1/> REGIONS: CPT

## 2021-01-15 NOTE — FLOWSHEET NOTE
I couldn't sleep some nights\"  Patient saw Dr. Chidi Calvin, had x-ray, MRI, RTC tear, 2 bone spurs, arthritis in shoulder. Patient underwent arthroscopic RTC repair, Juno, chondroplasty, labral debridement at [facility] on 11/4/20. Patient reports  pain primarilly after block wore off, but has improved. Patient has used pain medication and/or ibuprofen prn, sparingly. Patient is not able to sleep, patient is using R UE for ADLs. Patient is limiting activities due to L shoulder. patient is off work at present. Subjective:   Doing better    Objective:   Observation:    Test measurements:    DATE:                              12/7/20   PROM AROM AROM    (Left) (Left) Right   Flexion 130 100 165   Extension 50 45 65   Abduction 100 90 165   Internal Rotation 70 60 80   External Rotation 30 20 70           Exercises:  Exercise/Equipment Resistance/Repetitions Other comments        Pulley flexion 20x sitting   Gym Ball on Table Green, 1 x 15 reps, flexion  Green, 1 x 15, scaption standing   UE [place] Flexion, 1 x 15 reps standing        Theraslide Lat Pull Down, 1 x 10 reps, green  Row, 1 x 10 reps, green  Ext, 1 x 10 reps, green  Fencing, 1 x 10 reps, green  IR, 1 x 10 reps, green  ER, 1 x 10 reps, green standing                                              Other Therapeutic Activities:  11/11/20:  Discussed at length anatomy and biomechanics of the shoulder, muscle reeducation, motor recruitment. Home Exercise Program:  11/11/20:   Patient instructed in lower trap sets, Codman's pendulum, elbow flex/ext, pron/sup, wrist flex/ext, fist ; written instructions with pictures issued, patient able to demonstrate exercises. 11/23/20:   Patient instructed in flexion and scaption table slides written instructions with pictures issued, patient able to demonstrate exercises.   11/25/20:   Patient instructed in isometric shoulder flex, ext ; written instructions with pictures issued, patient able to demonstrate exercises. 11/30/20: isometric shoulder abd and add  12/10/20:   Patient instructed in isometric IR, ER ; written instructions with pictures issued, patient able to demonstrate exercises. 12/14/20:   Patient instructed in doorway stretch ; written instructions with pictures issued, patient able to demonstrate exercises. 12/21/20:   Patient instructed in wall slide with step flex and scaption ; written instructions with pictures issued, patient able to demonstrate exercises. 12/24/20 theraband lat pulls, rows, IR, ER, shoulder extension, fencing  1/13/21:   Patient instructed in wall push up; written instructions with pictures issued, patient able to demonstrate exercises. Manual Treatments:  Soft tissue mobilization; aggressive stretching, all motions    Modalities:     Progression Towards Functional goals:  [x] Patient is progressing as expected towards functional goals listed. [] Progression is slowed due to complexities listed. [] Progression has been slowed due to co-morbidities. [] Plan just implemented, too soon to assess goals progression  [] Other:    Charges: Therapeutic Exercise:  [x] (98627) Provided verbal/tactile cueing for activities to restore or maintain strength, flexibility, endurance, ROM for improvements with self-care, mobility, lifting and ambulation. Neuromuscular Re-Education  [] (32568) Provided verbal/tactile cueing for activities to restore or maintain balance, coordination, kinesthetic sense, posture, motor skill, proprioception for self-care, mobility, lifting, and ambulation. Therapeutic Activities:    [] (25532) Provided verbal/tactile cueing to address functional limitations related to loss of mobility, strength, balance, and coordination.      Gait Training:  [] (50613) Provided training and instruction to the patient for proper postural muscle recruitment and positioning with ambulation re-education     Home Exercise Program:    [x] (24717) Reviewed/Progressed HEP activities related to strengthening, flexibility, endurance, ROM for functional self-care, mobility, lifting and ambulation   [] (31669) Reviewed/Progressed HEP activities related to improving balance, coordination, kinesthetic sense, posture, motor skill, proprioception for self-care, mobility, lifting, and ambulation      Manual Treatments:  MFR / STM / Oscillations-Mobs:  G-I, II, III, IV / Manipulation / MLD  [x] (60014) Provided manual therapy to mobilize  soft tissue/joints/fluid for the purpose of modulating pain, promoting relaxation, increasing ROM, reducing/eliminating soft tissue swelling/inflammation/restriction, improving soft tissue extensibility and allowing for proper ROM for normal function with self- care, mobility, lifting and ambulation.         Timed Code Treatment Minutes: 45   Total Treatment Minutes: 50     [] EVAL (LOW) 20998   [] EVAL (MOD) 35436   [] EVAL (HIGH) 20125   [] RE-EVAL   [x] TE (07957) x     [] Aquatic (33014) x  [] NMR (12014)   x  [] Aquatic Group (50014) x  [x] Manual (62304) x  2  [] Ultrasound (03839) x  [] TA (40266) x  [] Mech Traction (83365)  [] Ionto (14984)           [] ES (un) (73084):   [] Vasopump (21448) [] Other:      Assessment  [x] Patient tolerated treatment well [] Patient limited by fatigue  [] Patient limited by pain  [] Patient limited by other medical complications  [] Other:     Prognosis: [x] Good [] Fair  [] Poor    Goals:    Short term goals  Time Frame for Short term goals: 4-6 weeks  Short term goal 1: Pain </= 0/10 at rest, 2/10 with activity  Improved but not met  Short term goal 2: Patient able to demonstrate AROM L shoulder WFL  Improved but not met  Short term goal 3: Patient able to demonstrate strength L shoulder >/= 3+/5  Improved but not met  Short term goal 4: Patient able to perform light chores without increased symptoms  Improved but not met  Short term goal 5: Patient independent with written home exercise program Ongoing Patient Requires Follow-up: [x] Yes  [] No    Plan: Focus on increasing ROM  [x] Continue per plan of care [] Alter current plan (see comments)  [] Plan of care initiated [] Hold pending MD visit [] Discharge  Note: If patient does not return for scheduled/ recommended follow up visits, this note will serve as a discharge from care along with most recent update on progress. Plan for Next Session:  Monitor response. Soft tissue mob, PROM L shoulder.   Electronically signed by:  Daniella Elias PTA

## 2021-01-18 ENCOUNTER — HOSPITAL ENCOUNTER (OUTPATIENT)
Dept: PHYSICAL THERAPY | Age: 66
Setting detail: THERAPIES SERIES
Discharge: HOME | End: 2021-01-18
Payer: COMMERCIAL

## 2021-01-18 PROCEDURE — 97140 MANUAL THERAPY 1/> REGIONS: CPT

## 2021-01-18 PROCEDURE — 97110 THERAPEUTIC EXERCISES: CPT

## 2021-01-18 NOTE — FLOWSHEET NOTE
Physical Therapy Daily Treatment Note  Date:  2021    Patient Name:  Raphael Carey    :  1955  MRN: [id number]    Restrictions/Precautions:  Restrictions/Precautions  Restrictions/Precautions: Fall Risk(Low)  Position Activity Restriction  Other position/activity restrictions: Rotator Cuff Repair Protocol  Sling:  Medium tear:  4 wks at all times, then 2 wks for activities  DOS:  20         Motion:  POD 1 (20):  Pendulum exercises, elbow/wrist/hand ROM  POD 14 (20): Supine PROM:  Flex to 130, abd to 40, ER to tolerance w/45 degree abd Start stationary bike  POD 21 (20):  P/AAROM:  Flex to 150, abd to 70, ER to tolerance with 45 degree abd  POD 42 (20):  P/AAROM:  Flex to 180, abd and ER to tolerance                  Begin active ROM, progress to full AROM as comfort allows    Strengthenin wk (Only small/medium tears): Gentle strengthening, isometrics, pain-free  10 wks:  Initiate full strengthening program; theraband ER/IR, side-lying ER;                Deltoid lateral raise; Elyssa-scapular strengthening; ;Progressive resistance  Pertinent Medical History:      Medical/Treatment Diagnosis Information:  · Diagnosis: s/p Left Rotator Cuff Repair Z98.890  · Treatment Diagnosis: s/p L Rotator Cuff Repair, L shoulder Pain, Decreased Functional Mobility    Insurance/Certification information:  PT Insurance Information: [address] [place] [address], allowed 30 PT visits per year, no prior authorization required  Physician Information:  Referring Practitioner: Taj Calderon MD  Plan of care signed (Y/N): Cosign received     Visit# / total visits:    Pain level: 0/10 at best, 2-3/10 at worst     Functional Outcomes Measure:  Test: QuickDASH  Score:21 = 22.73% Disability  (20)    Progress Note: [x]  Yes  []  No  Next due by: Visit #10      History of Injury:Patient reports a history of Left shoulder pain x 1 year. \"If I did anything physical with my arm it would hurt. I couldn't sleep some nights\"  Patient saw Dr. Phyllis Aguila, had x-ray, MRI, RTC tear, 2 bone spurs, arthritis in shoulder. Patient underwent arthroscopic RTC repair, Juno, chondroplasty, labral debridement at [facility] on 11/4/20. Patient reports  pain primarilly after block wore off, but has improved. Patient has used pain medication and/or ibuprofen prn, sparingly. Patient is not able to sleep, patient is using R UE for ADLs. Patient is limiting activities due to L shoulder. patient is off work at present. Subjective:   Doing better  1/18/21: Raising arm is getting a little easier. Gets tired. MD appt this Thursday     Objective:   Observation:    Test measurements:    DATE:                              12/7/20   PROM AROM AROM    (Left) (Left) Right   Flexion 130 100 165   Extension 50 45 65   Abduction 100 90 165   Internal Rotation 70 60 80   External Rotation 30 20 70           Exercises:  Exercise/Equipment Resistance/Repetitions Other comments        Pulley flexion 20x sitting   Gym Ball on Table Green, 1 x 15 reps, flexion  Green, 1 x 15, scaption standing   UE [place] Flexion, 1 x 15 reps standing        Theraslide Lat Pull Down, 1 x 10 reps, green  Row, 1 x 10 reps, green  Ext, 1 x 10 reps, green  Fencing, 1 x 10 reps, green  IR, 1 x 10 reps, green  ER, 1 x 10 reps, green standing                                              Other Therapeutic Activities:  11/11/20:  Discussed at length anatomy and biomechanics of the shoulder, muscle reeducation, motor recruitment. Home Exercise Program:  11/11/20:   Patient instructed in lower trap sets, Codman's pendulum, elbow flex/ext, pron/sup, wrist flex/ext, fist ; written instructions with pictures issued, patient able to demonstrate exercises. 11/23/20:   Patient instructed in flexion and scaption table slides written instructions with pictures issued, patient able to demonstrate exercises.   11/25/20:   Patient instructed in isometric shoulder ambulation re-education     Home Exercise Program:    [x] (42841) Reviewed/Progressed HEP activities related to strengthening, flexibility, endurance, ROM for functional self-care, mobility, lifting and ambulation   [] (92736) Reviewed/Progressed HEP activities related to improving balance, coordination, kinesthetic sense, posture, motor skill, proprioception for self-care, mobility, lifting, and ambulation      Manual Treatments:  MFR / STM / Oscillations-Mobs:  G-I, II, III, IV / Manipulation / MLD  [x] (92135) Provided manual therapy to mobilize  soft tissue/joints/fluid for the purpose of modulating pain, promoting relaxation, increasing ROM, reducing/eliminating soft tissue swelling/inflammation/restriction, improving soft tissue extensibility and allowing for proper ROM for normal function with self- care, mobility, lifting and ambulation.         Timed Code Treatment Minutes: 45   Total Treatment Minutes: 50     [] EVAL (LOW) 68763   [] EVAL (MOD) 11449   [] EVAL (HIGH) 22717   [] RE-EVAL   [x] TE (19574) x     [] Aquatic (30114) x  [] NMR (73972)   x  [] Aquatic Group (16718) x  [x] Manual (62843) x  2  [] Ultrasound (38007) x  [] TA (09781) x  [] Mech Traction (72685)  [] Ionto (00715)           [] ES (un) (80295):   [] Vasopump (09149) [] Other:      Assessment  [x] Patient tolerated treatment well [] Patient limited by fatigue  [] Patient limited by pain  [] Patient limited by other medical complications  [] Other:     Prognosis: [x] Good [] Fair  [] Poor    Goals:    Short term goals  Time Frame for Short term goals: 4-6 weeks  Short term goal 1: Pain </= 0/10 at rest, 2/10 with activity  Improved but not met  Short term goal 2: Patient able to demonstrate AROM L shoulder WFL  Improved but not met  Short term goal 3: Patient able to demonstrate strength L shoulder >/= 3+/5  Improved but not met  Short term goal 4: Patient able to perform light chores without increased symptoms  Improved but not met  Short term goal 5: Patient independent with written home exercise program Ongoing           Patient Requires Follow-up: [x] Yes  [] No    Plan: Focus on increasing ROM  [x] Continue per plan of care [] Alter current plan (see comments)  [] Plan of care initiated [] Hold pending MD visit [] Discharge  Note: If patient does not return for scheduled/ recommended follow up visits, this note will serve as a discharge from care along with most recent update on progress. Plan for Next Session:  Monitor response. Soft tissue mob, PROM L shoulder.  Note to MD  Electronically signed by:  January Salgado PTA

## 2021-01-20 ENCOUNTER — HOSPITAL ENCOUNTER (OUTPATIENT)
Dept: PHYSICAL THERAPY | Age: 66
Setting detail: THERAPIES SERIES
Discharge: HOME | End: 2021-01-20
Payer: COMMERCIAL

## 2021-01-20 PROCEDURE — 97110 THERAPEUTIC EXERCISES: CPT

## 2021-01-20 PROCEDURE — 97140 MANUAL THERAPY 1/> REGIONS: CPT

## 2021-01-20 NOTE — PROGRESS NOTES
Outpatient Physical Therapy  [] [facility]    Phone: [phone]   Fax: [phone]   [] [facility]  Phone: [phone]   Fax: [phone]  [x] [facility]              Phone: [phone]   Fax: [phone]     Physical Therapy Progress Note  Date: 2021        Patient Name:  Hardin Babinski    :  1955  MRN: [id number]     Restrictions/Precautions:  Restrictions/Precautions  Restrictions/Precautions: Fall Risk(Low)  Position Activity Restriction  Other position/activity restrictions: Rotator Cuff Repair Protocol  Sling:  Medium tear:  4 wks at all times, then 2 wks for activities  DOS:  20         Motion:  POD 1 (20):  Pendulum exercises, elbow/wrist/hand ROM  POD 14 (20): Supine PROM:  Flex to 130, abd to 40, ER to tolerance w/45 degree abd Start stationary bike  POD 21 (20):  P/AAROM:  Flex to 150, abd to 70, ER to tolerance with 45 degree abd  POD 42 (20):  P/AAROM:  Flex to 180, abd and ER to tolerance                  Begin active ROM, progress to full AROM as comfort allows     Strengthenin wk (Only small/medium tears): Gentle strengthening, isometrics, pain-free  10 wks:  Initiate full strengthening program; theraband ER/IR, side-lying ER;                Deltoid lateral raise; Elyssa-scapular strengthening; ;Progressive resistance  Pertinent Medical History:       Medical/Treatment Diagnosis Information:  · Diagnosis: s/p Left Rotator Cuff Repair Z98.890  · Treatment Diagnosis: s/p L Rotator Cuff Repair, L shoulder Pain, Decreased Functional Mobility     Insurance/Certification information:  PT Insurance Information: [facility] [employer], allowed 30 PT visits per year, no prior authorization required  Physician Information:  Referring Practitioner: Franklin Tubbs MD  Plan of care signed (Y/N):  Cosign received      Visit# / total visits:    Pain level:      0/10 at best, 2-3/10 at worst        Functional Outcomes Measure:  Test: QuickDASH  Score:21 = 22.73% Disability  (11/11/20)       Time Period for Report:  11/11/20-1/20/21  Cancels/No-shows to date:  None    Plan of Care/Treatment to date:  [x] Therapeutic Exercise    [] Modalities:  [x] Therapeutic Activity     [] Ultrasound  [] Electrical Stimulation  [] Gait Training      [] Cervical Traction    [] Lumbar Traction  [x] Neuromuscular Re-education  [] Cold/hotpack [] Iontophoresis  [x] Instruction in HEP      Other:  [x] Manual Therapy       []    [] Aquatic Therapy       []                          Significant Findings At Last Visit/Comments:    Subjective:   Patient reports shoulder is doing okay, \"but I have a catch or point where I need help to get the arm up, and then I can move it. \"     Objective:  · Observation: decreased mobility G-H joint, inf and pos  · Test measurements:    DATE:                              12/7/20 1/20/21    PROM AROM AROM PROM AROM     (Left) (Left) Right (Left) (Left)   Flexion 130 100 165 145 110   Extension 50 45 65 55 50   Abduction 100 90 165 130 100   Internal Rotation 70 60 80 70 60   External Rotation 30 20 70 35 20        Assessment:   Summary: Patient has been seen for 23 PT visits with significant improvement noted, however patient has tightness in his shoulder as well as delayed recruitment of rotator cuff during shoulder elevation and continues to have difficulty elevating Left shoulder. Patient would benefit from continued PT. Progression Towards Functional goals:  [x] Patient is progressing as expected towards functional goals listed. [] Progression is slowed due to complexities listed. [] Progression has been slowed due to co-morbidities.   [] Plan just implemented, too soon to assess goals progression  [] Other:    Goals:  Short term goals  Time Frame for Short term goals: 4-6 weeks  Short term goal 1: Pain </= 0/10 at rest, 2/10 with activity  Improved but not met  Short term goal 2: Patient able to demonstrate AROM L shoulder [employer] Improved but not met  Short term goal 3: Patient able to demonstrate strength L shoulder >/= 3+/5  Improved but not met  Short term goal 4: Patient able to perform light chores without increased symptoms  Improved but not met  Short term goal 5: Patient independent with written home exercise program Ongoing             Rehab Potential: [] Excellent [x] Good [] Fair  [] Poor     Goal Status:  [] Achieved [x] Partially Achieved  [] Not Achieved     Current Frequency/Duration:  # Days per week: [] 1 day # Weeks: [] 1 week [] 4 weeks      [x] 2 days   [] 2 weeks [] 5 weeks      [] 3 days   [] 3 weeks [x] 6 weeks     Patient Status: [x] Continue per initial plan of Care     [] Patient now discharged     [x] Additional visits requested, Please re-certify for additional visits:      Requested frequency/duration:  1-3/week for 4-6weeks    Electronically signed by:  SUSU [email or web address]    If you have any questions or concerns, please don't hesitate to call.   Thank you for your referral.    Physician Signature:________________________________Date:__________________  By signing above, therapists plan is approved by physician

## 2021-01-21 ENCOUNTER — OFFICE VISIT (OUTPATIENT)
Dept: ORTHOPEDIC SURGERY | Age: 66
End: 2021-01-21

## 2021-01-21 VITALS — BODY MASS INDEX: 30.74 KG/M2 | WEIGHT: 202.8 LBS | TEMPERATURE: 97.5 F | RESPIRATION RATE: 16 BRPM | HEIGHT: 68 IN

## 2021-01-21 DIAGNOSIS — Z98.890 S/P LEFT ROTATOR CUFF REPAIR: Primary | ICD-10-CM

## 2021-01-21 PROCEDURE — 99024 POSTOP FOLLOW-UP VISIT: CPT | Performed by: ORTHOPAEDIC SURGERY

## 2021-01-21 NOTE — PROGRESS NOTES
Yoon Benson  <[id number]>  January 21, 2021    Chief Complaint   Patient presents with    Post-Op Check     left shoulder        History: The patient is 2 1/2-months status post left shoulder rotator cuff repair. The only time he has pain in the shoulder is after therapy. The patient does report chronic stiffness in his shoulders dating all the way back to his teenage years. He denies any numbness or tingling. He denies any fever or chills. The patient's  past medical history, medications, allergies,  family history, social history, and review of systems have been reviewed, and dated and are recorded in the chart. Temp 97.5 °F (36.4 °C) (Infrared)   Resp 16   Ht 5' 8\" (1.727 m)   Wt 202 lb 12.8 oz (92 kg)   BMI 30.84 kg/m²     Physical:  Mr. Yoon Benson appears well, he is in no apparent distress, he demonstrates appropriate mood & affect. He is alert and oriented to person, place and time. He has mild swelling. He is neurovascularly intact distally. Sensation is intact in the axillary nerve distribution. left shoulder range of motion: 155 degrees abduction, 160 degrees forward flexion, 25 degrees external rotation and internal rotation to L4. He has minimal pain with light range of motion of the shoulder. The incisions are clean, dry and intact and without erythema. Strength in the shoulder is: 4/5-  Abduction and 4/5-  external rotation.        Impression: status post left shoulder arthroscopy, subacromial decompression and lux and rotator cuff repair Plan: At this time, the patient will continue with his outpatient therapy. The patient will continue his physical therapy for 1 more month. He will work aggressively on active and passive range of motion as well as strengthening. He will follow-up with me in approximately 6 weeks and we will reassess him then. He will continue to avoid overhead activities and limit his pushing, pulling and lifting. He is aware that activities should be limited until full range of motion and comfort have been regained. I have explained the time course and likely expectations for maximal recovery of motion and function.

## 2021-01-26 ENCOUNTER — HOSPITAL ENCOUNTER (OUTPATIENT)
Dept: PHYSICAL THERAPY | Age: 66
Setting detail: THERAPIES SERIES
Discharge: HOME | End: 2021-01-26
Payer: COMMERCIAL

## 2021-01-26 PROCEDURE — 97110 THERAPEUTIC EXERCISES: CPT

## 2021-01-26 PROCEDURE — 97140 MANUAL THERAPY 1/> REGIONS: CPT

## 2021-01-26 NOTE — FLOWSHEET NOTE
Physical Therapy Daily Treatment Note  Date:  2021    Patient Name:  Norah Torres    :  1955  MRN: [id number]    Restrictions/Precautions:  Restrictions/Precautions  Restrictions/Precautions: Fall Risk(Low)  Position Activity Restriction  Other position/activity restrictions: Rotator Cuff Repair Protocol  Sling:  Medium tear:  4 wks at all times, then 2 wks for activities  DOS:  20         Motion:  POD 1 (20):  Pendulum exercises, elbow/wrist/hand ROM  POD 14 (20): Supine PROM:  Flex to 130, abd to 40, ER to tolerance w/45 degree abd Start stationary bike  POD 21 (20):  P/AAROM:  Flex to 150, abd to 70, ER to tolerance with 45 degree abd  POD 42 (20):  P/AAROM:  Flex to 180, abd and ER to tolerance                  Begin active ROM, progress to full AROM as comfort allows    Strengthenin wk (Only small/medium tears): Gentle strengthening, isometrics, pain-free  10 wks:  Initiate full strengthening program; theraband ER/IR, side-lying ER;                Deltoid lateral raise; Elyssa-scapular strengthening; ;Progressive resistance  Pertinent Medical History:      Medical/Treatment Diagnosis Information:  · Diagnosis: s/p Left Rotator Cuff Repair Z98.890  · Treatment Diagnosis: s/p L Rotator Cuff Repair, L shoulder Pain, Decreased Functional Mobility    Insurance/Certification information:  PT Insurance Information: [address] [place] [address], allowed 30 PT visits per year, no prior authorization required  Physician Information:  Referring Practitioner: Efrain Tsang MD  Plan of care signed (Y/N): Cosign received     Visit# / total visits:    Pain level: 0/10 at best, 2-3/10 at worst     Functional Outcomes Measure:  Test: QuickDASH  Score:21 = 22.73% Disability  (20)    Progress Note: [x]  Yes  []  No  Next due by: Visit #10      History of Injury:Patient reports a history of Left shoulder pain x 1 year. \"If I did anything physical with my arm it would hurt. scaption table slides written instructions with pictures issued, patient able to demonstrate exercises. 11/25/20:   Patient instructed in isometric shoulder flex, ext ; written instructions with pictures issued, patient able to demonstrate exercises. 11/30/20: isometric shoulder abd and add  12/10/20:   Patient instructed in isometric IR, ER ; written instructions with pictures issued, patient able to demonstrate exercises. 12/14/20:   Patient instructed in doorway stretch ; written instructions with pictures issued, patient able to demonstrate exercises. 12/21/20:   Patient instructed in wall slide with step flex and scaption ; written instructions with pictures issued, patient able to demonstrate exercises. 12/24/20 theraband lat pulls, rows, IR, ER, shoulder extension, fencing  1/13/21:   Patient instructed in wall push up; written instructions with pictures issued, patient able to demonstrate exercises. 1/26/21:   Patient instructed in CON flex with cane assist, ECC flex independently ; written instructions with pictures issued, patient able to demonstrate exercises. Manual Treatments:  Soft tissue mobilization; aggressive stretching, all motions. Con/ECC shoulder flex and scaption from 90-end range    Modalities:     Progression Towards Functional goals:  [x] Patient is progressing as expected towards functional goals listed. [] Progression is slowed due to complexities listed. [] Progression has been slowed due to co-morbidities. [] Plan just implemented, too soon to assess goals progression  [] Other:    Charges: Therapeutic Exercise:  [x] (40695) Provided verbal/tactile cueing for activities to restore or maintain strength, flexibility, endurance, ROM for improvements with self-care, mobility, lifting and ambulation.     Neuromuscular Re-Education  [] (50463) Provided verbal/tactile cueing for activities to restore or maintain balance, coordination, kinesthetic sense, posture, motor skill, proprioception for self-care, mobility, lifting, and ambulation. Therapeutic Activities:    [] (46815) Provided verbal/tactile cueing to address functional limitations related to loss of mobility, strength, balance, and coordination. Gait Training:  [] (46502) Provided training and instruction to the patient for proper postural muscle recruitment and positioning with ambulation re-education     Home Exercise Program:    [x] (17291) Reviewed/Progressed HEP activities related to strengthening, flexibility, endurance, ROM for functional self-care, mobility, lifting and ambulation   [] (79218) Reviewed/Progressed HEP activities related to improving balance, coordination, kinesthetic sense, posture, motor skill, proprioception for self-care, mobility, lifting, and ambulation      Manual Treatments:  MFR / STM / Oscillations-Mobs:  G-I, II, III, IV / Manipulation / MLD  [x] (97885) Provided manual therapy to mobilize  soft tissue/joints/fluid for the purpose of modulating pain, promoting relaxation, increasing ROM, reducing/eliminating soft tissue swelling/inflammation/restriction, improving soft tissue extensibility and allowing for proper ROM for normal function with self- care, mobility, lifting and ambulation.         Timed Code Treatment Minutes: 45   Total Treatment Minutes: 50     [] EVAL (LOW) 95273   [] EVAL (MOD) 46839   [] EVAL (HIGH) 42220   [] RE-EVAL   [x] TE (87664) x     [] Aquatic (53710) x  [] NMR (15106)   x  [] Aquatic Group (91920) x  [x] Manual (11458) x  2  [] Ultrasound (39306) x  [] TA (27517) x  [] Mech Traction (11438)  [] Ionto (17879)           [] ES (un) (64201):   [] Vasopump (19749) [] Other:      Assessment  [x] Patient tolerated treatment well [] Patient limited by fatigue  [] Patient limited by pain  [] Patient limited by other medical complications  [] Other:     Prognosis: [x] Good [] Fair  [] Poor    Goals:    Short term goals  Time Frame for Short term goals: 4-6 weeks  Short term goal 1: Pain </= 0/10 at rest, 2/10 with activity  Improved but not met  Short term goal 2: Patient able to demonstrate AROM L shoulder WFL  Improved but not met  Short term goal 3: Patient able to demonstrate strength L shoulder >/= 3+/5  Improved but not met  Short term goal 4: Patient able to perform light chores without increased symptoms  Improved but not met  Short term goal 5: Patient independent with written home exercise program Ongoing           Patient Requires Follow-up: [x] Yes  [] No    Plan: Focus on increasing ROM  [x] Continue per plan of care [] Alter current plan (see comments)  [] Plan of care initiated [] Hold pending MD visit [] Discharge  Note: If patient does not return for scheduled/ recommended follow up visits, this note will serve as a discharge from care along with most recent update on progress. Plan for Next Session:  Monitor response. Soft tissue mob, PROM L shoulder. Focus on CON strengthening of shoulder flex, scaption.     Electronically signed by:  Aubree Cristina, 1971 [facility]

## 2021-01-29 ENCOUNTER — HOSPITAL ENCOUNTER (OUTPATIENT)
Dept: PHYSICAL THERAPY | Age: 66
Setting detail: THERAPIES SERIES
Discharge: HOME | End: 2021-01-29
Payer: COMMERCIAL

## 2021-01-29 PROCEDURE — 97140 MANUAL THERAPY 1/> REGIONS: CPT

## 2021-01-29 PROCEDURE — 97110 THERAPEUTIC EXERCISES: CPT

## 2021-01-29 NOTE — FLOWSHEET NOTE
Physical Therapy Daily Treatment Note  Date:  2021    Patient Name:  Silvia Jackson    :  1955  MRN: [id number]    Restrictions/Precautions:  Restrictions/Precautions  Restrictions/Precautions: Fall Risk(Low)  Position Activity Restriction  Other position/activity restrictions: Rotator Cuff Repair Protocol  Sling:  Medium tear:  4 wks at all times, then 2 wks for activities  DOS:  20         Motion:  POD 1 (20):  Pendulum exercises, elbow/wrist/hand ROM  POD 14 (20): Supine PROM:  Flex to 130, abd to 40, ER to tolerance w/45 degree abd Start stationary bike  POD 21 (20):  P/AAROM:  Flex to 150, abd to 70, ER to tolerance with 45 degree abd  POD 42 (20):  P/AAROM:  Flex to 180, abd and ER to tolerance                  Begin active ROM, progress to full AROM as comfort allows    Strengthenin wk (Only small/medium tears): Gentle strengthening, isometrics, pain-free  10 wks:  Initiate full strengthening program; theraband ER/IR, side-lying ER;                Deltoid lateral raise; Elyssa-scapular strengthening; ;Progressive resistance  Pertinent Medical History:      Medical/Treatment Diagnosis Information:  · Diagnosis: s/p Left Rotator Cuff Repair Z98.890  · Treatment Diagnosis: s/p L Rotator Cuff Repair, L shoulder Pain, Decreased Functional Mobility    Insurance/Certification information:  PT Insurance Information: [address] [place] [address], allowed 30 PT visits per year, no prior authorization required  Physician Information:  Referring Practitioner: Nicole Root MD  Plan of care signed (Y/N): Cosign received     Visit# / total visits:    Pain level: 0/10 at best, 2-3/10 at worst     Functional Outcomes Measure:  Test: QuickDASH  Score:21 = 22.73% Disability  (20)    Progress Note: [x]  Yes  []  No  Next due by: Visit #10      History of Injury:Patient reports a history of Left shoulder pain x 1 year. \"If I did anything physical with my arm it would hurt. I couldn't sleep some nights\"  Patient saw Dr. Ector Sales, had x-ray, MRI, RTC tear, 2 bone spurs, arthritis in shoulder. Patient underwent arthroscopic RTC repair, Juno, chondroplasty, labral debridement at [facility] on 11/4/20. Patient reports  pain primarilly after block wore off, but has improved. Patient has used pain medication and/or ibuprofen prn, sparingly. Patient is not able to sleep, patient is using R UE for ADLs. Patient is limiting activities due to L shoulder. patient is off work at present. Subjective:   Patient saw MD, advised to continue PT to increase strength and ROM. Patient continues to have difficulty with CON shoulder elevation. 1/29/21 Pt stated \"everything is pretty good but\" pt noted difficulty reaching arm overhead actively. Objective:   Observation: decreased mobility G-H joint, inf and pos   Test measurements:    DATE:                              12/7/20 1/20/21   PROM AROM AROM PROM AROM    (Left) (Left) Right (Left) (Left)   Flexion 130 100 165 145 110   Extension 50 45 65 55 50   Abduction 100 90 165 130 100   Internal Rotation 70 60 80 70 60   External Rotation 30 20 70 35 20           Exercises:  Exercise/Equipment Resistance/Repetitions Other comments        Pulley flexion 20x sitting   Gym Ball on Table Green, 1 x 15 reps, flexion  Green, 1 x 15, scaption standing   UE [place] Flexion, 1 x 15 reps standing        Theraslide Lat Pull Down, 1 x 10 reps, green  Row, 1 x 10 reps, green  Ext, 1 x 10 reps, green  Fencing, 1 x 10 reps, green  IR, 1 x 10 reps, green  ER, 1 x 10 reps, green standing                                              Other Therapeutic Activities:  11/11/20:  Discussed at length anatomy and biomechanics of the shoulder, muscle reeducation, motor recruitment.     Home Exercise Program:  11/11/20:   Patient instructed in lower trap sets, Codman's pendulum, elbow flex/ext, pron/sup, wrist flex/ext, fist ; written instructions with pictures issued, patient able to demonstrate exercises. 11/23/20:   Patient instructed in flexion and scaption table slides written instructions with pictures issued, patient able to demonstrate exercises. 11/25/20:   Patient instructed in isometric shoulder flex, ext ; written instructions with pictures issued, patient able to demonstrate exercises. 11/30/20: isometric shoulder abd and add  12/10/20:   Patient instructed in isometric IR, ER ; written instructions with pictures issued, patient able to demonstrate exercises. 12/14/20:   Patient instructed in doorway stretch ; written instructions with pictures issued, patient able to demonstrate exercises. 12/21/20:   Patient instructed in wall slide with step flex and scaption ; written instructions with pictures issued, patient able to demonstrate exercises. 12/24/20 theraband lat pulls, rows, IR, ER, shoulder extension, fencing  1/13/21:   Patient instructed in wall push up; written instructions with pictures issued, patient able to demonstrate exercises. 1/26/21:   Patient instructed in CON flex with cane assist, ECC flex independently ; written instructions with pictures issued, patient able to demonstrate exercises. 1/29/21 serratus punches, alphabet    Manual Treatments:  Soft tissue mobilization; aggressive stretching, all motions. Con/ECC shoulder flex and scaption from 90-end range    Modalities:     Progression Towards Functional goals:  [x] Patient is progressing as expected towards functional goals listed. [] Progression is slowed due to complexities listed. [] Progression has been slowed due to co-morbidities. [] Plan just implemented, too soon to assess goals progression  [] Other:    Charges: Therapeutic Exercise:  [x] (71360) Provided verbal/tactile cueing for activities to restore or maintain strength, flexibility, endurance, ROM for improvements with self-care, mobility, lifting and ambulation.     Neuromuscular Re-Education  [] (15804) Provided verbal/tactile cueing for activities to restore or maintain balance, coordination, kinesthetic sense, posture, motor skill, proprioception for self-care, mobility, lifting, and ambulation. Therapeutic Activities:    [] (21895) Provided verbal/tactile cueing to address functional limitations related to loss of mobility, strength, balance, and coordination. Gait Training:  [] (34864) Provided training and instruction to the patient for proper postural muscle recruitment and positioning with ambulation re-education     Home Exercise Program:    [x] (85220) Reviewed/Progressed HEP activities related to strengthening, flexibility, endurance, ROM for functional self-care, mobility, lifting and ambulation   [] (00399) Reviewed/Progressed HEP activities related to improving balance, coordination, kinesthetic sense, posture, motor skill, proprioception for self-care, mobility, lifting, and ambulation      Manual Treatments:  MFR / STM / Oscillations-Mobs:  G-I, II, III, IV / Manipulation / MLD  [x] (89604) Provided manual therapy to mobilize  soft tissue/joints/fluid for the purpose of modulating pain, promoting relaxation, increasing ROM, reducing/eliminating soft tissue swelling/inflammation/restriction, improving soft tissue extensibility and allowing for proper ROM for normal function with self- care, mobility, lifting and ambulation.         Timed Code Treatment Minutes: 45   Total Treatment Minutes: 50     [] EVAL (LOW) 08189   [] EVAL (MOD) 84633   [] EVAL (HIGH) 22678   [] RE-EVAL   [x] TE (01009) x     [] Aquatic (35429) x  [] NMR (84124)   x  [] Aquatic Group (30620) x  [x] Manual (88018) x  2  [] Ultrasound (42593) x  [] TA (81604) x  [] Mech Traction (23062)  [] Ionto (71878)           [] ES (un) (65857):   [] Vasopump (30190) [] Other:      Assessment  [x] Patient tolerated treatment well [] Patient limited by fatigue  [] Patient limited by pain  [] Patient limited by other medical complications  [] Other:     Prognosis: [x] Good [] Fair  [] Poor    Goals:    Short term goals  Time Frame for Short term goals: 4-6 weeks  Short term goal 1: Pain </= 0/10 at rest, 2/10 with activity  Improved but not met  Short term goal 2: Patient able to demonstrate AROM L shoulder WFL  Improved but not met  Short term goal 3: Patient able to demonstrate strength L shoulder >/= 3+/5  Improved but not met  Short term goal 4: Patient able to perform light chores without increased symptoms  Improved but not met  Short term goal 5: Patient independent with written home exercise program Ongoing           Patient Requires Follow-up: [x] Yes  [] No    Plan: Focus on increasing ROM  [x] Continue per plan of care [] Alter current plan (see comments)  [] Plan of care initiated [] Hold pending MD visit [] Discharge  Note: If patient does not return for scheduled/ recommended follow up visits, this note will serve as a discharge from care along with most recent update on progress. Plan for Next Session:  Monitor response. Soft tissue mob, PROM L shoulder. Focus on CON strengthening of shoulder flex, scaption.     Electronically signed by:  Grant Casanova, [address] [facility],[address]

## 2021-02-02 ENCOUNTER — HOSPITAL ENCOUNTER (OUTPATIENT)
Dept: PHYSICAL THERAPY | Age: 66
Setting detail: THERAPIES SERIES
Discharge: HOME | End: 2021-02-02
Payer: COMMERCIAL

## 2021-02-02 PROCEDURE — 97110 THERAPEUTIC EXERCISES: CPT

## 2021-02-02 PROCEDURE — 97140 MANUAL THERAPY 1/> REGIONS: CPT

## 2021-02-02 NOTE — FLOWSHEET NOTE
Physical Therapy Daily Treatment Note  Date:  2021    Patient Name:  Elina Gomez    :  1955  MRN: [id number]    Restrictions/Precautions:  Restrictions/Precautions  Restrictions/Precautions: Fall Risk(Low)  Position Activity Restriction  Other position/activity restrictions: Rotator Cuff Repair Protocol  Sling:  Medium tear:  4 wks at all times, then 2 wks for activities  DOS:  20         Motion:  POD 1 (20):  Pendulum exercises, elbow/wrist/hand ROM  POD 14 (20): Supine PROM:  Flex to 130, abd to 40, ER to tolerance w/45 degree abd Start stationary bike  POD 21 (20):  P/AAROM:  Flex to 150, abd to 70, ER to tolerance with 45 degree abd  POD 42 (20):  P/AAROM:  Flex to 180, abd and ER to tolerance                  Begin active ROM, progress to full AROM as comfort allows    Strengthenin wk (Only small/medium tears): Gentle strengthening, isometrics, pain-free  10 wks:  Initiate full strengthening program; theraband ER/IR, side-lying ER;                Deltoid lateral raise; Elyssa-scapular strengthening; ;Progressive resistance  Pertinent Medical History:      Medical/Treatment Diagnosis Information:  · Diagnosis: s/p Left Rotator Cuff Repair Z98.890  · Treatment Diagnosis: s/p L Rotator Cuff Repair, L shoulder Pain, Decreased Functional Mobility    Insurance/Certification information:  PT Insurance Information: [address] [place] [address], allowed 30 PT visits per year, no prior authorization required  Physician Information:  Referring Practitioner: Munira Toro MD  Plan of care signed (Y/N): Cosign received     Visit# / total visits:    Pain level: 0/10 at best, 2-3/10 at worst     Functional Outcomes Measure:  Test: QuickDASH  Score:21 = 22.73% Disability  (20)    Progress Note: [x]  Yes  []  No  Next due by: Visit #10      History of Injury:Patient reports a history of Left shoulder pain x 1 year. \"If I did anything physical with my arm it would hurt. elbow flex/ext, pron/sup, wrist flex/ext, fist ; written instructions with pictures issued, patient able to demonstrate exercises. 11/23/20:   Patient instructed in flexion and scaption table slides written instructions with pictures issued, patient able to demonstrate exercises. 11/25/20:   Patient instructed in isometric shoulder flex, ext ; written instructions with pictures issued, patient able to demonstrate exercises. 11/30/20: isometric shoulder abd and add  12/10/20:   Patient instructed in isometric IR, ER ; written instructions with pictures issued, patient able to demonstrate exercises. 12/14/20:   Patient instructed in doorway stretch ; written instructions with pictures issued, patient able to demonstrate exercises. 12/21/20:   Patient instructed in wall slide with step flex and scaption ; written instructions with pictures issued, patient able to demonstrate exercises. 12/24/20 theraband lat pulls, rows, IR, ER, shoulder extension, fencing  1/13/21:   Patient instructed in wall push up; written instructions with pictures issued, patient able to demonstrate exercises. 1/26/21:   Patient instructed in CON flex with cane assist, ECC flex independently ; written instructions with pictures issued, patient able to demonstrate exercises. 1/29/21 serratus punches, alphabet  2/1/21:   Patient instructed in IR with ext and ER with flex on pulleys ; written instructions with pictures issued, patient able to demonstrate exercises. Manual Treatments:  Joint Mob Grade 2, Inf and post G-H joint,  Soft tissue mobilization; aggressive stretching, all motions. Con/ECC shoulder flex and scaption from 90-end range. Arm pull. Modalities:     Progression Towards Functional goals:  [x] Patient is progressing as expected towards functional goals listed. [] Progression is slowed due to complexities listed. [] Progression has been slowed due to co-morbidities.   [] Plan just implemented, too soon to assess goals progression  [] Other:    Charges: Therapeutic Exercise:  [x] (72948) Provided verbal/tactile cueing for activities to restore or maintain strength, flexibility, endurance, ROM for improvements with self-care, mobility, lifting and ambulation. Neuromuscular Re-Education  [] (70325) Provided verbal/tactile cueing for activities to restore or maintain balance, coordination, kinesthetic sense, posture, motor skill, proprioception for self-care, mobility, lifting, and ambulation. Therapeutic Activities:    [] (69826) Provided verbal/tactile cueing to address functional limitations related to loss of mobility, strength, balance, and coordination. Gait Training:  [] (32483) Provided training and instruction to the patient for proper postural muscle recruitment and positioning with ambulation re-education     Home Exercise Program:    [x] (99748) Reviewed/Progressed HEP activities related to strengthening, flexibility, endurance, ROM for functional self-care, mobility, lifting and ambulation   [] (17113) Reviewed/Progressed HEP activities related to improving balance, coordination, kinesthetic sense, posture, motor skill, proprioception for self-care, mobility, lifting, and ambulation      Manual Treatments:  MFR / STM / Oscillations-Mobs:  G-I, II, III, IV / Manipulation / MLD  [x] (26085) Provided manual therapy to mobilize  soft tissue/joints/fluid for the purpose of modulating pain, promoting relaxation, increasing ROM, reducing/eliminating soft tissue swelling/inflammation/restriction, improving soft tissue extensibility and allowing for proper ROM for normal function with self- care, mobility, lifting and ambulation.         Timed Code Treatment Minutes: 45   Total Treatment Minutes: 50     [] EVAL (LOW) 64685   [] EVAL (MOD) 72658   [] EVAL (HIGH) 86831   [] RE-EVAL   [x] TE (91557) x     [] Aquatic (51708) x  [] NMR (58784)   x  [] Aquatic Group (52199) x  [x] Manual (15105) x  2  [] Ultrasound

## 2021-02-05 ENCOUNTER — HOSPITAL ENCOUNTER (OUTPATIENT)
Dept: PHYSICAL THERAPY | Age: 66
Setting detail: THERAPIES SERIES
Discharge: HOME | End: 2021-02-05
Payer: COMMERCIAL

## 2021-02-05 PROCEDURE — 97140 MANUAL THERAPY 1/> REGIONS: CPT

## 2021-02-05 PROCEDURE — 97110 THERAPEUTIC EXERCISES: CPT

## 2021-02-05 NOTE — FLOWSHEET NOTE
I couldn't sleep some nights\"  Patient saw Dr. Jason Ortiz, had x-ray, MRI, RTC tear, 2 bone spurs, arthritis in shoulder. Patient underwent arthroscopic RTC repair, Juno, chondroplasty, labral debridement at [facility] on 11/4/20. Patient reports  pain primarilly after block wore off, but has improved. Patient has used pain medication and/or ibuprofen prn, sparingly. Patient is not able to sleep, patient is using R UE for ADLs. Patient is limiting activities due to L shoulder. patient is off work at present. Subjective:   Patient saw MD, advised to continue PT to increase strength and ROM. Patient continues to have difficulty with CON shoulder elevation. 1/29/21 Pt stated \"everything is pretty good but\" pt noted difficulty reaching arm overhead actively. 2/1/21:   Patient reports shoulder is doing \" a little better but still stiff. \"  2/5/21:  Just has difficulty raising arm    Objective:   Observation: decreased mobility G-H joint, inf and pos   Test measurements:    DATE:                              12/7/20 1/20/21   PROM AROM AROM PROM AROM    (Left) (Left) Right (Left) (Left)   Flexion 130 100 165 145 110   Extension 50 45 65 55 50   Abduction 100 90 165 130 100   Internal Rotation 70 60 80 70 60   External Rotation 30 20 70 35 20           Exercises:  Exercise/Equipment Resistance/Repetitions Other comments        Pulley  Flexion, x 20  IR with Ext, x 20  ER with flex, x 20 Sitting  standing   UE [place] Flexion, 1 x 15 reps standing    Scaption, 1 x 10 reps standing   Theraslide Lat Pull Down, 1 x 10 reps, green  Row, 1 x 10 reps, green  Ext, 1 x 10 reps, green  Fencing, 1 x 10 reps, green  IR, 1 x 10 reps, green  ER, 1 x 10 reps, green standing                                              Other Therapeutic Activities:  11/11/20:  Discussed at length anatomy and biomechanics of the shoulder, muscle reeducation, motor recruitment.     Home Exercise Program:  11/11/20:   Patient instructed in lower trap sets, Codman's pendulum, elbow flex/ext, pron/sup, wrist flex/ext, fist ; written instructions with pictures issued, patient able to demonstrate exercises. 11/23/20:   Patient instructed in flexion and scaption table slides written instructions with pictures issued, patient able to demonstrate exercises. 11/25/20:   Patient instructed in isometric shoulder flex, ext ; written instructions with pictures issued, patient able to demonstrate exercises. 11/30/20: isometric shoulder abd and add  12/10/20:   Patient instructed in isometric IR, ER ; written instructions with pictures issued, patient able to demonstrate exercises. 12/14/20:   Patient instructed in doorway stretch ; written instructions with pictures issued, patient able to demonstrate exercises. 12/21/20:   Patient instructed in wall slide with step flex and scaption ; written instructions with pictures issued, patient able to demonstrate exercises. 12/24/20 theraband lat pulls, rows, IR, ER, shoulder extension, fencing  1/13/21:   Patient instructed in wall push up; written instructions with pictures issued, patient able to demonstrate exercises. 1/26/21:   Patient instructed in CON flex with cane assist, ECC flex independently ; written instructions with pictures issued, patient able to demonstrate exercises. 1/29/21 serratus punches, alphabet  2/1/21:   Patient instructed in IR with ext and ER with flex on pulleys ; written instructions with pictures issued, patient able to demonstrate exercises. Manual Treatments:  Joint Mob Grade 2, Inf and post G-H joint,  Soft tissue mobilization; aggressive stretching, all motions. Con/ECC shoulder flex and scaption from 90-end range. Arm pull. Modalities:     Progression Towards Functional goals:  [x] Patient is progressing as expected towards functional goals listed. [] Progression is slowed due to complexities listed.   [] Progression has been slowed due to Aquatic Group (47127) x  [x] Manual (74626) x  2  [] Ultrasound (88310) x  [] TA (55607) x  [] Mech Traction (16669)  [] Ionto (93023)           [] ES (un) (55137):   [] Vasopump (48199) [] Other:      Assessment  [x] Patient tolerated treatment well [] Patient limited by fatigue  [] Patient limited by pain  [] Patient limited by other medical complications  [] Other:     Prognosis: [x] Good [] Fair  [] Poor    Goals:    Short term goals  Time Frame for Short term goals: 4-6 weeks  Short term goal 1: Pain </= 0/10 at rest, 2/10 with activity  Improved but not met  Short term goal 2: Patient able to demonstrate AROM L shoulder WFL  Improved but not met  Short term goal 3: Patient able to demonstrate strength L shoulder >/= 3+/5  Improved but not met  Short term goal 4: Patient able to perform light chores without increased symptoms  Improved but not met  Short term goal 5: Patient independent with written home exercise program Ongoing           Patient Requires Follow-up: [x] Yes  [] No    Plan: Focus on increasing ROM  [x] Continue per plan of care [] Alter current plan (see comments)  [] Plan of care initiated [] Hold pending MD visit [] Discharge  Note: If patient does not return for scheduled/ recommended follow up visits, this note will serve as a discharge from care along with most recent update on progress. Plan for Next Session:  Monitor response. Focus on CON strengthening of shoulder flex, scaption, PROM at end range flex and scap.     Electronically signed by:  Buddy Frazier PTA

## 2021-02-08 ENCOUNTER — HOSPITAL ENCOUNTER (OUTPATIENT)
Dept: PHYSICAL THERAPY | Age: 66
Setting detail: THERAPIES SERIES
Discharge: HOME | End: 2021-02-08
Payer: COMMERCIAL

## 2021-02-08 PROCEDURE — 97140 MANUAL THERAPY 1/> REGIONS: CPT

## 2021-02-08 PROCEDURE — 97110 THERAPEUTIC EXERCISES: CPT

## 2021-02-08 NOTE — FLOWSHEET NOTE
Physical Therapy Daily Treatment Note  Date:  2021    Patient Name:  Rosmery Carreno    :  1955  MRN: [id number]    Restrictions/Precautions:  Restrictions/Precautions  Restrictions/Precautions: Fall Risk(Low)  Position Activity Restriction  Other position/activity restrictions: Rotator Cuff Repair Protocol  Sling:  Medium tear:  4 wks at all times, then 2 wks for activities  DOS:  20         Motion:  POD 1 (20):  Pendulum exercises, elbow/wrist/hand ROM  POD 14 (20): Supine PROM:  Flex to 130, abd to 40, ER to tolerance w/45 degree abd Start stationary bike  POD 21 (20):  P/AAROM:  Flex to 150, abd to 70, ER to tolerance with 45 degree abd  POD 42 (20):  P/AAROM:  Flex to 180, abd and ER to tolerance                  Begin active ROM, progress to full AROM as comfort allows    Strengthenin wk (Only small/medium tears): Gentle strengthening, isometrics, pain-free  10 wks:  Initiate full strengthening program; theraband ER/IR, side-lying ER;                Deltoid lateral raise; Elyssa-scapular strengthening; ;Progressive resistance  Pertinent Medical History:      Medical/Treatment Diagnosis Information:  · Diagnosis: s/p Left Rotator Cuff Repair Z98.890  · Treatment Diagnosis: s/p L Rotator Cuff Repair, L shoulder Pain, Decreased Functional Mobility    Insurance/Certification information:  PT Insurance Information: [address] [place] [address], allowed 30 PT visits per year, no prior authorization required  Physician Information:  Referring Practitioner: Blake Barros MD  Plan of care signed (Y/N): Cosign received     Visit# / total visits:    Pain level: 0/10 at best, 2-3/10 at worst     Functional Outcomes Measure:  Test: QuickDASH  Score:21 = 22.73% Disability  (20)    Progress Note: [x]  Yes  []  No  Next due by: Visit #10      History of Injury:Patient reports a history of Left shoulder pain x 1 year. \"If I did anything physical with my arm it would hurt. I couldn't sleep some nights\"  Patient saw Dr. Cadence Phillips, had x-ray, MRI, RTC tear, 2 bone spurs, arthritis in shoulder. Patient underwent arthroscopic RTC repair, Juno, chondroplasty, labral debridement at [facility] on 11/4/20. Patient reports  pain primarilly after block wore off, but has improved. Patient has used pain medication and/or ibuprofen prn, sparingly. Patient is not able to sleep, patient is using R UE for ADLs. Patient is limiting activities due to L shoulder. patient is off work at present. Subjective:   Patient saw MD, advised to continue PT to increase strength and ROM. Patient continues to have difficulty with CON shoulder elevation. 1/29/21 Pt stated \"everything is pretty good but\" pt noted difficulty reaching arm overhead actively. 2/1/21:   Patient reports shoulder is doing \" a little better but still stiff. \"  2/5/21:  Just has difficulty raising arm    Objective:   Observation: decreased mobility G-H joint, inf and pos   Test measurements:    DATE:                              12/7/20 1/20/21   PROM AROM AROM PROM AROM    (Left) (Left) Right (Left) (Left)   Flexion 130 100 165 145 110   Extension 50 45 65 55 50   Abduction 100 90 165 130 100   Internal Rotation 70 60 80 70 60   External Rotation 30 20 70 35 20           Exercises:  Exercise/Equipment Resistance/Repetitions Other comments        Pulley  Flexion, x 20  IR with Ext, x 20  ER with flex, x 20 Sitting  standing   UE [place] Flexion, 1 x 15 reps standing    Scaption, 1 x 10 reps standing   Theraslide Lat Pull Down, 1 x 15 reps, green  Row, 1 x 15 reps, green  Ext, 1 x 15 reps, green  Fencing, 1 x 15 reps, green  IR, 1 x 15 reps, green  ER, 1 x 15 reps, green standing   Cane IR behind the back 20 sec x 3                                          Other Therapeutic Activities:  11/11/20:  Discussed at length anatomy and biomechanics of the shoulder, muscle reeducation, motor recruitment.     Home Exercise Program:  11/11/20:   Patient instructed in lower trap sets, Codman's pendulum, elbow flex/ext, pron/sup, wrist flex/ext, fist ; written instructions with pictures issued, patient able to demonstrate exercises. 11/23/20:   Patient instructed in flexion and scaption table slides written instructions with pictures issued, patient able to demonstrate exercises. 11/25/20:   Patient instructed in isometric shoulder flex, ext ; written instructions with pictures issued, patient able to demonstrate exercises. 11/30/20: isometric shoulder abd and add  12/10/20:   Patient instructed in isometric IR, ER ; written instructions with pictures issued, patient able to demonstrate exercises. 12/14/20:   Patient instructed in doorway stretch ; written instructions with pictures issued, patient able to demonstrate exercises. 12/21/20:   Patient instructed in wall slide with step flex and scaption ; written instructions with pictures issued, patient able to demonstrate exercises. 12/24/20 theraband lat pulls, rows, IR, ER, shoulder extension, fencing  1/13/21:   Patient instructed in wall push up; written instructions with pictures issued, patient able to demonstrate exercises. 1/26/21:   Patient instructed in CON flex with cane assist, ECC flex independently ; written instructions with pictures issued, patient able to demonstrate exercises. 1/29/21 serratus punches, alphabet  2/1/21:   Patient instructed in IR with ext and ER with flex on pulleys ; written instructions with pictures issued, patient able to demonstrate exercises. 2/8/21  No pain just stiffness      Manual Treatments:  Joint Mob Grade 3, Inf and post G-H joint,  Soft tissue mobilization; aggressive stretching, all motions. Con/ECC shoulder flex and scaption from 90-end range. Arm pull. Contract relax ER    Modalities:     Progression Towards Functional goals:  [x] Patient is progressing as expected towards functional goals listed.     [] Progression is slowed due to complexities listed. [] Progression has been slowed due to co-morbidities. [] Plan just implemented, too soon to assess goals progression  [] Other:    Charges: Therapeutic Exercise:  [x] (78672) Provided verbal/tactile cueing for activities to restore or maintain strength, flexibility, endurance, ROM for improvements with self-care, mobility, lifting and ambulation. Neuromuscular Re-Education  [] (07873) Provided verbal/tactile cueing for activities to restore or maintain balance, coordination, kinesthetic sense, posture, motor skill, proprioception for self-care, mobility, lifting, and ambulation. Therapeutic Activities:    [] (95831) Provided verbal/tactile cueing to address functional limitations related to loss of mobility, strength, balance, and coordination. Gait Training:  [] (90743) Provided training and instruction to the patient for proper postural muscle recruitment and positioning with ambulation re-education     Home Exercise Program:    [x] (51344) Reviewed/Progressed HEP activities related to strengthening, flexibility, endurance, ROM for functional self-care, mobility, lifting and ambulation   [] (90793) Reviewed/Progressed HEP activities related to improving balance, coordination, kinesthetic sense, posture, motor skill, proprioception for self-care, mobility, lifting, and ambulation      Manual Treatments:  MFR / STM / Oscillations-Mobs:  G-I, II, III, IV / Manipulation / MLD  [x] (68957) Provided manual therapy to mobilize  soft tissue/joints/fluid for the purpose of modulating pain, promoting relaxation, increasing ROM, reducing/eliminating soft tissue swelling/inflammation/restriction, improving soft tissue extensibility and allowing for proper ROM for normal function with self- care, mobility, lifting and ambulation.         Timed Code Treatment Minutes: 40   Total Treatment Minutes: 40     [] EVAL (LOW) 47285   [] EVAL (MOD) 62851   [] EVAL (HIGH) 95996   [] RE-EVAL   [x] TE (07133) x     [] Aquatic (44623) x  [] NMR (32380)   x  [] Aquatic Group (43642) x  [x] Manual (66929) x  2  [] Ultrasound (99897) x  [] TA (85832) x  [] Mech Traction (85300)  [] Ionto (55070)           [] ES (un) (02122):   [] Vasopump (19420) [] Other:      Assessment  [x] Patient tolerated treatment well [] Patient limited by fatigue  [] Patient limited by pain  [] Patient limited by other medical complications  [] Other:     Prognosis: [x] Good [] Fair  [] Poor    Goals:    Short term goals  Time Frame for Short term goals: 4-6 weeks  Short term goal 1: Pain </= 0/10 at rest, 2/10 with activity  Improved but not met  Short term goal 2: Patient able to demonstrate AROM L shoulder WFL  Improved but not met  Short term goal 3: Patient able to demonstrate strength L shoulder >/= 3+/5  Improved but not met  Short term goal 4: Patient able to perform light chores without increased symptoms  Improved but not met  Short term goal 5: Patient independent with written home exercise program Ongoing           Patient Requires Follow-up: [x] Yes  [] No    Plan: Focus on increasing ROM  [x] Continue per plan of care [] Alter current plan (see comments)  [] Plan of care initiated [] Hold pending MD visit [] Discharge  Note: If patient does not return for scheduled/ recommended follow up visits, this note will serve as a discharge from care along with most recent update on progress. Plan for Next Session:  Monitor response. Focus on CON strengthening of shoulder flex, scaption, PROM at end range flex and scap. Electronically signed by:   Antonieta Maldonado, PT

## 2021-02-10 ENCOUNTER — HOSPITAL ENCOUNTER (OUTPATIENT)
Dept: PHYSICAL THERAPY | Age: 66
Setting detail: THERAPIES SERIES
Discharge: HOME | End: 2021-02-10
Payer: COMMERCIAL

## 2021-02-10 PROCEDURE — 97110 THERAPEUTIC EXERCISES: CPT

## 2021-02-10 PROCEDURE — 97140 MANUAL THERAPY 1/> REGIONS: CPT

## 2021-02-10 NOTE — FLOWSHEET NOTE
I couldn't sleep some nights\"  Patient saw Dr. Tess Vasques, had x-ray, MRI, RTC tear, 2 bone spurs, arthritis in shoulder. Patient underwent arthroscopic RTC repair, Juno, chondroplasty, labral debridement at [facility] on 11/4/20. Patient reports  pain primarilly after block wore off, but has improved. Patient has used pain medication and/or ibuprofen prn, sparingly. Patient is not able to sleep, patient is using R UE for ADLs. Patient is limiting activities due to L shoulder. patient is off work at present. Subjective:   Patient saw MD, advised to continue PT to increase strength and ROM. Patient continues to have difficulty with CON shoulder elevation. 1/29/21 Pt stated \"everything is pretty good but\" pt noted difficulty reaching arm overhead actively. 2/1/21:   Patient reports shoulder is doing \" a little better but still stiff. \"  2/5/21:  Just has difficulty raising arm  2/10/21:  Nothing new.  Is stiff this morning    Objective:   Observation: decreased mobility G-H joint, inf and pos   Test measurements:    DATE:                              12/7/20 1/20/21   PROM AROM AROM PROM AROM    (Left) (Left) Right (Left) (Left)   Flexion 130 100 165 145 110   Extension 50 45 65 55 50   Abduction 100 90 165 130 100   Internal Rotation 70 60 80 70 60   External Rotation 30 20 70 35 20           Exercises:  Exercise/Equipment Resistance/Repetitions Other comments        Pulley  Flexion, x 20  IR with Ext, x 20  ER with flex, x 20 Sitting  standing   UE [place] Flexion, 1 x 15 reps standing    Scaption, 1 x 10 reps standing   Theraslide Lat Pull Down, 1 x 15 reps, green  Row, 1 x 15 reps, green  Ext, 1 x 15 reps, green  Fencing, 1 x 15 reps, green  IR, 1 x 15 reps, green  ER, 1 x 15 reps, green standing   Cane IR behind the back 20 sec x 3                                          Other Therapeutic Activities:  11/11/20:  Discussed at length anatomy and biomechanics of the shoulder, muscle reeducation, motor recruitment. Home Exercise Program:  11/11/20:   Patient instructed in lower trap sets, Codman's pendulum, elbow flex/ext, pron/sup, wrist flex/ext, fist ; written instructions with pictures issued, patient able to demonstrate exercises. 11/23/20:   Patient instructed in flexion and scaption table slides written instructions with pictures issued, patient able to demonstrate exercises. 11/25/20:   Patient instructed in isometric shoulder flex, ext ; written instructions with pictures issued, patient able to demonstrate exercises. 11/30/20: isometric shoulder abd and add  12/10/20:   Patient instructed in isometric IR, ER ; written instructions with pictures issued, patient able to demonstrate exercises. 12/14/20:   Patient instructed in doorway stretch ; written instructions with pictures issued, patient able to demonstrate exercises. 12/21/20:   Patient instructed in wall slide with step flex and scaption ; written instructions with pictures issued, patient able to demonstrate exercises. 12/24/20 theraband lat pulls, rows, IR, ER, shoulder extension, fencing  1/13/21:   Patient instructed in wall push up; written instructions with pictures issued, patient able to demonstrate exercises. 1/26/21:   Patient instructed in CON flex with cane assist, ECC flex independently ; written instructions with pictures issued, patient able to demonstrate exercises. 1/29/21 serratus punches, alphabet  2/1/21:   Patient instructed in IR with ext and ER with flex on pulleys ; written instructions with pictures issued, patient able to demonstrate exercises. 2/8/21  No pain just stiffness      Manual Treatments:  Joint Mob Grade 3, Inf and post G-H joint,  Soft tissue mobilization; aggressive stretching, all motions. Con/ECC shoulder flex and scaption from 90-end range. Arm pull.   Contract relax ER    Modalities:     Progression Towards Functional goals:  [x] Patient is progressing as expected towards (MOD) 09762   [] EVAL (HIGH) 66384   [] RE-EVAL   [x] TE (28763) x     [] Aquatic (40158) x  [] NMR (68241)   x  [] Aquatic Group (73477) x  [x] Manual (00656) x  2  [] Ultrasound (92810) x  [] TA (26018) x  [] Mech Traction (83182)  [] Ionto (84465)           [] ES (un) (99282):   [] Vasopump (36653) [] Other:      Assessment  [x] Patient tolerated treatment well [] Patient limited by fatigue  [] Patient limited by pain  [] Patient limited by other medical complications  [] Other:     Prognosis: [x] Good [] Fair  [] Poor    Goals:    Short term goals  Time Frame for Short term goals: 4-6 weeks  Short term goal 1: Pain </= 0/10 at rest, 2/10 with activity  Improved but not met  Short term goal 2: Patient able to demonstrate AROM L shoulder WFL  Improved but not met  Short term goal 3: Patient able to demonstrate strength L shoulder >/= 3+/5  Improved but not met  Short term goal 4: Patient able to perform light chores without increased symptoms  Improved but not met  Short term goal 5: Patient independent with written home exercise program Ongoing           Patient Requires Follow-up: [x] Yes  [] No    Plan: Focus on increasing ROM  [x] Continue per plan of care [] Alter current plan (see comments)  [] Plan of care initiated [] Hold pending MD visit [] Discharge  Note: If patient does not return for scheduled/ recommended follow up visits, this note will serve as a discharge from care along with most recent update on progress. Plan for Next Session:  Monitor response. Focus on CON strengthening of shoulder flex, scaption, PROM at end range flex and scap.     Electronically signed by:  Patsy Mohamud PTA

## 2021-02-15 ENCOUNTER — HOSPITAL ENCOUNTER (OUTPATIENT)
Dept: PHYSICAL THERAPY | Age: 66
Setting detail: THERAPIES SERIES
Discharge: HOME | End: 2021-02-15
Payer: COMMERCIAL

## 2021-02-15 PROCEDURE — 97140 MANUAL THERAPY 1/> REGIONS: CPT

## 2021-02-15 PROCEDURE — 97110 THERAPEUTIC EXERCISES: CPT

## 2021-02-15 NOTE — FLOWSHEET NOTE
Physical Therapy Daily Treatment Note  Date:  2/15/2021    Patient Name:  Nadya Momin    :  1955  MRN: [id number]    Restrictions/Precautions:  Restrictions/Precautions  Restrictions/Precautions: Fall Risk(Low)  Position Activity Restriction  Other position/activity restrictions: Rotator Cuff Repair Protocol  Sling:  Medium tear:  4 wks at all times, then 2 wks for activities  DOS:  20         Motion:  POD 1 (20):  Pendulum exercises, elbow/wrist/hand ROM  POD 14 (20): Supine PROM:  Flex to 130, abd to 40, ER to tolerance w/45 degree abd Start stationary bike  POD 21 (20):  P/AAROM:  Flex to 150, abd to 70, ER to tolerance with 45 degree abd  POD 42 (20):  P/AAROM:  Flex to 180, abd and ER to tolerance                  Begin active ROM, progress to full AROM as comfort allows    Strengthenin wk (Only small/medium tears): Gentle strengthening, isometrics, pain-free  10 wks:  Initiate full strengthening program; theraband ER/IR, side-lying ER;                Deltoid lateral raise; Elyssa-scapular strengthening; ;Progressive resistance  Pertinent Medical History:      Medical/Treatment Diagnosis Information:  · Diagnosis: s/p Left Rotator Cuff Repair Z98.890  · Treatment Diagnosis: s/p L Rotator Cuff Repair, L shoulder Pain, Decreased Functional Mobility    Insurance/Certification information:  PT Insurance Information: [address] [place] [address], allowed 30 PT visits per year, no prior authorization required  Physician Information:  Referring Practitioner: Franki Castillo MD  Plan of care signed (Y/N): Cosign received     Visit# / total visits:    Pain level: 0/10 at best, 2-3/10 at worst     Functional Outcomes Measure:  Test: QuickDASH  Score:21 = 22.73% Disability  (20)    Progress Note: [x]  Yes  []  No  Next due by: Visit #10      History of Injury:Patient reports a history of Left shoulder pain x 1 year. \"If I did anything physical with my arm it would hurt. I couldn't sleep some nights\"  Patient saw Dr. Nicolas Rios, had x-ray, MRI, RTC tear, 2 bone spurs, arthritis in shoulder. Patient underwent arthroscopic RTC repair, Juno, chondroplasty, labral [facility]bridement at [facility] on 11/4/20. Patient reports  pain primarilly after block wore off, but has improved. Patient has used pain medication and/or ibuprofen prn, sparingly. Patient is not able to sleep, patient is using R UE for ADLs. Patient is limiting activities due to L shoulder. patient is off work at present. Subjective:   Patient saw MD, advised to continue PT to increase strength and ROM. Patient continues to have difficulty with CON shoulder elevation. 1/29/21 Pt stated \"everything is pretty good but\" pt noted difficulty reaching arm overhead actively. 2/1/21:   Patient reports shoulder is doing \" a little better but still stiff. \"  2/5/21:  Just has difficulty raising arm  2/8/21  No pain just stiffness  2/10/21:  Nothing new. Is stiff this morning  2/15/21:  Patient reports shoulder is improving, but continues to be stiff.     Objective:   Observation: decreased mobility G-H joint, inf and pos   Test measurements:    DATE:                              12/7/20 1/20/21   PROM AROM AROM PROM AROM    (Left) (Left) Right (Left) (Left)   Flexion 130 100 165 145 110   Extension 50 45 65 55 50   Abduction 100 90 165 130 100   Internal Rotation 70 60 80 70 60   External Rotation 30 20 70 35 20           Exercises:  Exercise/Equipment Resistance/Repetitions Other comments        Pulley  Flexion, x 20  IR with Ext, x 20  ER with flex, x 20 Sitting  standing   UE [place] Flexion, 1 x 15 reps standing    Scaption, 1 x 10 reps standing   Theraslide Lat Pull Down, 1 x 15 reps, orange  Row, 1 x 15 reps, orange  Ext, 1 x 15 reps, orange  Fencing, 1 x 15 reps, orange  IR, 1 x 15 reps, green  ER, 1 x 15 reps, green standing   Cane IR behind the back 20 sec x 3 Other Therapeutic Activities:  11/11/20:  Discussed at length anatomy and biomechanics of the shoulder, muscle reeducation, motor recruitment. Home Exercise Program:  11/11/20:   Patient instructed in lower trap sets, Codman's pendulum, elbow flex/ext, pron/sup, wrist flex/ext, fist ; written instructions with pictures issued, patient able to demonstrate exercises. 11/23/20:   Patient instructed in flexion and scaption table slides written instructions with pictures issued, patient able to demonstrate exercises. 11/25/20:   Patient instructed in isometric shoulder flex, ext ; written instructions with pictures issued, patient able to demonstrate exercises. 11/30/20: isometric shoulder abd and add  12/10/20:   Patient instructed in isometric IR, ER ; written instructions with pictures issued, patient able to demonstrate exercises. 12/14/20:   Patient instructed in doorway stretch ; written instructions with pictures issued, patient able to demonstrate exercises. 12/21/20:   Patient instructed in wall slide with step flex and scaption ; written instructions with pictures issued, patient able to demonstrate exercises. 12/24/20 theraband lat pulls, rows, IR, ER, shoulder extension, fencing  1/13/21:   Patient instructed in wall push up; written instructions with pictures issued, patient able to demonstrate exercises. 1/26/21:   Patient instructed in CON flex with cane assist, ECC flex independently ; written instructions with pictures issued, patient able to demonstrate exercises. 1/29/21 serratus punches, alphabet  2/1/21:   Patient instructed in IR with ext and ER with flex on pulleys ; written instructions with pictures issued, patient able to demonstrate exercises. Manual Treatments:  Joint Mob Grade 3, Inf and post G-H joint,  Soft tissue mobilization; aggressive stretching, all motions. Con/ECC shoulder flex and scaption from 90-end range. Arm pull.   Contract relax ER    Modalities: Progression Towards Functional goals:  [x] Patient is progressing as expected towards functional goals listed. [] Progression is slowed due to complexities listed. [] Progression has been slowed due to co-morbidities. [] Plan just implemented, too soon to assess goals progression  [] Other:    Charges: Therapeutic Exercise:  [x] (89609) Provided verbal/tactile cueing for activities to restore or maintain strength, flexibility, endurance, ROM for improvements with self-care, mobility, lifting and ambulation. Neuromuscular Re-Education  [] (20162) Provided verbal/tactile cueing for activities to restore or maintain balance, coordination, kinesthetic sense, posture, motor skill, proprioception for self-care, mobility, lifting, and ambulation. Therapeutic Activities:    [] (54281) Provided verbal/tactile cueing to address functional limitations related to loss of mobility, strength, balance, and coordination. Gait Training:  [] (58458) Provided training and instruction to the patient for proper postural muscle recruitment and positioning with ambulation re-education     Home Exercise Program:    [x] (34887) Reviewed/Progressed HEP activities related to strengthening, flexibility, endurance, ROM for functional self-care, mobility, lifting and ambulation   [] (98917) Reviewed/Progressed HEP activities related to improving balance, coordination, kinesthetic sense, posture, motor skill, proprioception for self-care, mobility, lifting, and ambulation      Manual Treatments:  MFR / STM / Oscillations-Mobs:  G-I, II, III, IV / Manipulation / MLD  [x] (45912) Provided manual therapy to mobilize  soft tissue/joints/fluid for the purpose of modulating pain, promoting relaxation, increasing ROM, reducing/eliminating soft tissue swelling/inflammation/restriction, improving soft tissue extensibility and allowing for proper ROM for normal function with self- care, mobility, lifting and ambulation.         Timed Code Treatment Minutes: 45   Total Treatment Minutes: 45     [] EVAL (LOW) 14641   [] EVAL (MOD) 35085   [] EVAL (HIGH) 65830   [] RE-EVAL   [x] TE (51091) x     [] Aquatic (23266) x  [] NMR (12118)   x  [] Aquatic Group (09428) x  [x] Manual (74402) x  2  [] Ultrasound (00990) x  [] TA (52625) x  [] Mech Traction (18003)  [] Ionto (78842)           [] ES (un) (84455):   [] Vasopump (77179) [] Other:      Assessment  [x] Patient tolerated treatment well [] Patient limited by fatigue  [] Patient limited by pain  [] Patient limited by other medical complications  [] Other:     Prognosis: [x] Good [] Fair  [] Poor    Goals:    Short term goals  Time Frame for Short term goals: 4-6 weeks  Short term goal 1: Pain </= 0/10 at rest, 2/10 with activity  Improved but not met  Short term goal 2: Patient able to demonstrate AROM L shoulder WFL  Improved but not met  Short term goal 3: Patient able to demonstrate strength L shoulder >/= 3+/5  Improved but not met  Short term goal 4: Patient able to perform light chores without increased symptoms  Improved but not met  Short term goal 5: Patient independent with written home exercise program Ongoing           Patient Requires Follow-up: [x] Yes  [] No    Plan: Focus on increasing ROM  [x] Continue per plan of care [] Alter current plan (see comments)  [] Plan of care initiated [] Hold pending MD visit [] Discharge  Note: If patient does not return for scheduled/ recommended follow up visits, this note will serve as a discharge from care along with most recent update on progress. Plan for Next Session:  Monitor response. Focus on CON strengthening of shoulder flex, scaption, PROM at end range flex and scap.     Electronically signed by:  Ruddy Tapia, [id number] [facility]

## 2021-02-17 ENCOUNTER — HOSPITAL ENCOUNTER (OUTPATIENT)
Dept: PHYSICAL THERAPY | Age: 66
Setting detail: THERAPIES SERIES
Discharge: HOME | End: 2021-02-17
Payer: COMMERCIAL

## 2021-02-17 PROCEDURE — 97110 THERAPEUTIC EXERCISES: CPT

## 2021-02-17 PROCEDURE — 97140 MANUAL THERAPY 1/> REGIONS: CPT

## 2021-02-17 NOTE — FLOWSHEET NOTE
Physical Therapy Daily Treatment Note  Date:  2021    Patient Name:  Elana Lefort    :  1955  MRN: [id number]    Restrictions/Precautions:  Restrictions/Precautions  Restrictions/Precautions: Fall Risk(Low)  Position Activity Restriction  Other position/activity restrictions: Rotator Cuff Repair Protocol  Sling:  Medium tear:  4 wks at all times, then 2 wks for activities  DOS:  20         Motion:  POD 1 (20):  Pendulum exercises, elbow/wrist/hand ROM  POD 14 (20): Supine PROM:  Flex to 130, abd to 40, ER to tolerance w/45 degree abd Start stationary bike  POD 21 (20):  P/AAROM:  Flex to 150, abd to 70, ER to tolerance with 45 degree abd  POD 42 (20):  P/AAROM:  Flex to 180, abd and ER to tolerance                  Begin active ROM, progress to full AROM as comfort allows    Strengthenin wk (Only small/medium tears): Gentle strengthening, isometrics, pain-free  10 wks:  Initiate full strengthening program; theraband ER/IR, side-lying ER;                Deltoid lateral raise; Elyssa-scapular strengthening; ;Progressive resistance  Pertinent Medical History:      Medical/Treatment Diagnosis Information:  · Diagnosis: s/p Left Rotator Cuff Repair Z98.890  · Treatment Diagnosis: s/p L Rotator Cuff Repair, L shoulder Pain, Decreased Functional Mobility    Insurance/Certification information:  PT Insurance Information: [address] [place] [address], allowed 30 PT visits per year, no prior authorization required  Physician Information:  Referring Practitioner: Galo Fernando MD  Plan of care signed (Y/N): Cosign received     Visit# / total visits:    Pain level: 0/10 at best, 2-3/10 at worst     Functional Outcomes Measure:  Test: QuickDASH  Score:21 = 22.73% Disability  (20)    Progress Note: [x]  Yes  []  No  Next due by: Visit #10      History of Injury:Patient reports a history of Left shoulder pain x 1 year. \"If I did anything physical with my arm it would hurt. I couldn't sleep some nights\"  Patient saw Dr. Rigo Esposito, had x-ray, MRI, RTC tear, 2 bone spurs, arthritis in shoulder. Patient underwent arthroscopic RTC repair, Juno, chondroplasty, labral debridement at [facility] on 11/4/20. Patient reports  pain primarilly after block wore off, but has improved. Patient has used pain medication and/or ibuprofen prn, sparingly. Patient is not able to sleep, patient is using R UE for ADLs. Patient is limiting activities due to L shoulder. patient is off work at present. Subjective:   Patient saw MD, advised to continue PT to increase strength and ROM. Patient continues to have difficulty with CON shoulder elevation. 1/29/21 Pt stated \"everything is pretty good but\" pt noted difficulty reaching arm overhead actively. 2/1/21:   Patient reports shoulder is doing \" a little better but still stiff. \"  2/5/21:  Just has difficulty raising arm  2/8/21  No pain just stiffness  2/10/21:  Nothing new. Is stiff this morning  2/15/21:  Patient reports shoulder is improving, but continues to be stiff.   2/17:     Objective:   Observation: decreased mobility G-H joint, inf and pos   Test measurements:    DATE:                              12/7/20 1/20/21   PROM AROM AROM PROM AROM    (Left) (Left) Right (Left) (Left)   Flexion 130 100 165 145 110   Extension 50 45 65 55 50   Abduction 100 90 165 130 100   Internal Rotation 70 60 80 70 60   External Rotation 30 20 70 35 20           Exercises:  Exercise/Equipment Resistance/Repetitions Other comments        Pulley  Flexion, x 20  IR with Ext, x 20  ER with flex, x 20 Sitting  standing   UE [place] Flexion, 1 x 15 reps standing    Scaption, 1 x 10 reps standing   Theraslide Lat Pull Down, 1 x 15 reps, orange  Row, 1 x 15 reps, orange  Ext, 1 x 15 reps, orange  Fencing, 1 x 15 reps, orange  IR, 1 x 15 reps, orange  ER, 1 x 15 reps, orange standing   Cane IR behind the back 20 sec x 3 Other Therapeutic Activities:  11/11/20:  Discussed at length anatomy and biomechanics of the shoulder, muscle reeducation, motor recruitment. Home Exercise Program:  11/11/20:   Patient instructed in lower trap sets, Codman's pendulum, elbow flex/ext, pron/sup, wrist flex/ext, fist ; written instructions with pictures issued, patient able to demonstrate exercises. 11/23/20:   Patient instructed in flexion and scaption table slides written instructions with pictures issued, patient able to demonstrate exercises. 11/25/20:   Patient instructed in isometric shoulder flex, ext ; written instructions with pictures issued, patient able to demonstrate exercises. 11/30/20: isometric shoulder abd and add  12/10/20:   Patient instructed in isometric IR, ER ; written instructions with pictures issued, patient able to demonstrate exercises. 12/14/20:   Patient instructed in doorway stretch ; written instructions with pictures issued, patient able to demonstrate exercises. 12/21/20:   Patient instructed in wall slide with step flex and scaption ; written instructions with pictures issued, patient able to demonstrate exercises. 12/24/20 theraband lat pulls, rows, IR, ER, shoulder extension, fencing  1/13/21:   Patient instructed in wall push up; written instructions with pictures issued, patient able to demonstrate exercises. 1/26/21:   Patient instructed in CON flex with cane assist, ECC flex independently ; written instructions with pictures issued, patient able to demonstrate exercises. 1/29/21 serratus punches, alphabet  2/1/21:   Patient instructed in IR with ext and ER with flex on pulleys ; written instructions with pictures issued, patient able to demonstrate exercises. 2/17: nothing new      Manual Treatments:  Joint Mob Grade 3, Inf and post G-H joint,  Soft tissue mobilization; aggressive stretching, all motions. Con/ECC shoulder flex and scaption from 90-end range. Arm pull.   Contract relax

## 2021-02-22 ENCOUNTER — HOSPITAL ENCOUNTER (OUTPATIENT)
Dept: PHYSICAL THERAPY | Age: 66
Setting detail: THERAPIES SERIES
Discharge: HOME | End: 2021-02-22
Payer: COMMERCIAL

## 2021-02-22 PROCEDURE — 97140 MANUAL THERAPY 1/> REGIONS: CPT

## 2021-02-22 PROCEDURE — 97110 THERAPEUTIC EXERCISES: CPT

## 2021-02-22 NOTE — FLOWSHEET NOTE
Physical Therapy Daily Treatment Note  Date:  2021    Patient Name:  Ian Patterson    :  1955  MRN: [id number]    Restrictions/Precautions:  Restrictions/Precautions  Restrictions/Precautions: Fall Risk(Low)  Position Activity Restriction  Other position/activity restrictions: Rotator Cuff Repair Protocol  Sling:  Medium tear:  4 wks at all times, then 2 wks for activities  DOS:  20         Motion:  POD 1 (20):  Pendulum exercises, elbow/wrist/hand ROM  POD 14 (20): Supine PROM:  Flex to 130, abd to 40, ER to tolerance w/45 degree abd Start stationary bike  POD 21 (20):  P/AAROM:  Flex to 150, abd to 70, ER to tolerance with 45 degree abd  POD 42 (20):  P/AAROM:  Flex to 180, abd and ER to tolerance                  Begin active ROM, progress to full AROM as comfort allows    Strengthenin wk (Only small/medium tears): Gentle strengthening, isometrics, pain-free  10 wks:  Initiate full strengthening program; theraband ER/IR, side-lying ER;                Deltoid lateral raise; Elyssa-scapular strengthening; ;Progressive resistance  Pertinent Medical History:      Medical/Treatment Diagnosis Information:  · Diagnosis: s/p Left Rotator Cuff Repair Z98.890  · Treatment Diagnosis: s/p L Rotator Cuff Repair, L shoulder Pain, Decreased Functional Mobility    Insurance/Certification information:  PT Insurance Information: [address] [place] [address], allowed 30 PT visits per year, no prior authorization required  Physician Information:  Referring Practitioner: Yahaira Andrews MD  Plan of care signed (Y/N): Cosign received     Visit# / total visits:    Pain level: 0/10 at best, 2-3/10 at worst     Functional Outcomes Measure:  Test: QuickDASH  Score:21 = 22.73% Disability  (20)    Progress Note: [x]  Yes  []  No  Next due by: Visit #10      History of Injury:Patient reports a history of Left shoulder pain x 1 year. \"If I did anything physical with my arm it would hurt. I couldn't sleep some nights\"  Patient saw Dr. Norma Abreu, had x-ray, MRI, RTC tear, 2 bone spurs, arthritis in shoulder. Patient underwent arthroscopic RTC repair, Juno, chondroplasty, labral debridement at [facility] on 11/4/20. Patient reports  pain primarilly after block wore off, but has improved. Patient has used pain medication and/or ibuprofen prn, sparingly. Patient is not able to sleep, patient is using R UE for ADLs. Patient is limiting activities due to L shoulder. patient is off work at present. Subjective:   Patient saw MD, advised to continue PT to increase strength and ROM. Patient continues to have difficulty with CON shoulder elevation. 1/29/21 Pt stated \"everything is pretty good but\" pt noted difficulty reaching arm overhead actively. 2/1/21:   Patient reports shoulder is doing \" a little better but still stiff. \"  2/5/21:  Just has difficulty raising arm  2/8/21  No pain just stiffness  2/10/21:  Nothing new. Is stiff this morning  2/15/21:  Patient reports shoulder is improving, but continues to be stiff.  2/22:  Getting better every day. Sleeping better at night.  Still tends to guard it at times    Objective:   Observation: decreased mobility G-H joint, inf and pos   Test measurements:    DATE:                              12/7/20 1/20/21   PROM AROM AROM PROM AROM    (Left) (Left) Right (Left) (Left)   Flexion 130 100 165 145 110   Extension 50 45 65 55 50   Abduction 100 90 165 130 100   Internal Rotation 70 60 80 70 60   External Rotation 30 20 70 35 20           Exercises:  Exercise/Equipment Resistance/Repetitions Other comments        Pulley  Flexion, x 20  IR with Ext, x 20  ER with flex, x 20 Sitting  standing   UE [place] Flexion, 1 x 15 reps standing    Scaption, 1 x 10 reps standing   Theraslide Lat Pull Down, 1 x 15 reps, orange  Row, 1 x 15 reps, orange  Ext, 1 x 15 reps, orange  Fencing, 1 x 15 reps, orange  IR, 1 x 15 reps, orange  ER, 1 x 15 reps, orange standing   Cane IR behind the back 20 sec x 3                                          Other Therapeutic Activities:  11/11/20:  Discussed at length anatomy and biomechanics of the shoulder, muscle reeducation, motor recruitment. Home Exercise Program:  11/11/20:   Patient instructed in lower trap sets, Codman's pendulum, elbow flex/ext, pron/sup, wrist flex/ext, fist ; written instructions with pictures issued, patient able to demonstrate exercises. 11/23/20:   Patient instructed in flexion and scaption table slides written instructions with pictures issued, patient able to demonstrate exercises. 11/25/20:   Patient instructed in isometric shoulder flex, ext ; written instructions with pictures issued, patient able to demonstrate exercises. 11/30/20: isometric shoulder abd and add  12/10/20:   Patient instructed in isometric IR, ER ; written instructions with pictures issued, patient able to demonstrate exercises. 12/14/20:   Patient instructed in doorway stretch ; written instructions with pictures issued, patient able to demonstrate exercises. 12/21/20:   Patient instructed in wall slide with step flex and scaption ; written instructions with pictures issued, patient able to demonstrate exercises. 12/24/20 theraband lat pulls, rows, IR, ER, shoulder extension, fencing  1/13/21:   Patient instructed in wall push up; written instructions with pictures issued, patient able to demonstrate exercises. 1/26/21:   Patient instructed in CON flex with cane assist, ECC flex independently ; written instructions with pictures issued, patient able to demonstrate exercises. 1/29/21 serratus punches, alphabet  2/1/21:   Patient instructed in IR with ext and ER with flex on pulleys ; written instructions with pictures issued, patient able to demonstrate exercises. 2/17: nothing new      Manual Treatments:  Joint Mob Grade 3, Inf and post G-H joint,  Soft tissue mobilization; aggressive stretching, all motions. Con/ECC shoulder flex and scaption from 90-end range. Arm pull. Contract relax ER    Modalities:     Progression Towards Functional goals:  [x] Patient is progressing as expected towards functional goals listed. [] Progression is slowed due to complexities listed. [] Progression has been slowed due to co-morbidities. [] Plan just implemented, too soon to assess goals progression  [] Other:    Charges: Therapeutic Exercise:  [x] (26666) Provided verbal/tactile cueing for activities to restore or maintain strength, flexibility, endurance, ROM for improvements with self-care, mobility, lifting and ambulation. Neuromuscular Re-Education  [] (10653) Provided verbal/tactile cueing for activities to restore or maintain balance, coordination, kinesthetic sense, posture, motor skill, proprioception for self-care, mobility, lifting, and ambulation. Therapeutic Activities:    [] (32036) Provided verbal/tactile cueing to address functional limitations related to loss of mobility, strength, balance, and coordination.      Gait Training:  [] (06261) Provided training and instruction to the patient for proper postural muscle recruitment and positioning with ambulation re-education     Home Exercise Program:    [x] (47970) Reviewed/Progressed HEP activities related to strengthening, flexibility, endurance, ROM for functional self-care, mobility, lifting and ambulation   [] (44902) Reviewed/Progressed HEP activities related to improving balance, coordination, kinesthetic sense, posture, motor skill, proprioception for self-care, mobility, lifting, and ambulation      Manual Treatments:  MFR / STM / Oscillations-Mobs:  G-I, II, III, IV / Manipulation / MLD  [x] (86005) Provided manual therapy to mobilize  soft tissue/joints/fluid for the purpose of modulating pain, promoting relaxation, increasing ROM, reducing/eliminating soft tissue swelling/inflammation/restriction, improving soft tissue extensibility and allowing for proper ROM for normal function with self- care, mobility, lifting and ambulation. Timed Code Treatment Minutes: 45   Total Treatment Minutes: 45     [] EVAL (LOW) 46841   [] EVAL (MOD) 68989   [] EVAL (HIGH) 39155   [] RE-EVAL   [x] TE (11273) x     [] Aquatic (04200) x  [] NMR (10776)   x  [] Aquatic Group (44543) x  [x] Manual (23027) x  2  [] Ultrasound (63392) x  [] TA (12595) x  [] Mech Traction (88595)  [] Ionto (18993)           [] ES (un) (90746):   [] Vasopump (28300) [] Other:      Assessment  [x] Patient tolerated treatment well [] Patient limited by fatigue  [] Patient limited by pain  [] Patient limited by other medical complications  [] Other:     Prognosis: [x] Good [] Fair  [] Poor    Goals:    Short term goals  Time Frame for Short term goals: 4-6 weeks  Short term goal 1: Pain </= 0/10 at rest, 2/10 with activity  Improved but not met  Short term goal 2: Patient able to demonstrate AROM L shoulder WFL  Improved but not met  Short term goal 3: Patient able to demonstrate strength L shoulder >/= 3+/5  Improved but not met  Short term goal 4: Patient able to perform light chores without increased symptoms  Improved but not met  Short term goal 5: Patient independent with written home exercise program Ongoing           Patient Requires Follow-up: [x] Yes  [] No    Plan: Focus on increasing ROM  [x] Continue per plan of care [] Alter current plan (see comments)  [] Plan of care initiated [] Hold pending MD visit [] Discharge  Note: If patient does not return for scheduled/ recommended follow up visits, this note will serve as a discharge from care along with most recent update on progress. Plan for Next Session:  Monitor response. Focus on CON strengthening of shoulder flex, scaption, PROM at end range flex and scap.     Electronically signed by:  Leni Joyner PTA

## 2021-02-24 ENCOUNTER — HOSPITAL ENCOUNTER (OUTPATIENT)
Dept: PHYSICAL THERAPY | Age: 66
Setting detail: THERAPIES SERIES
Discharge: HOME | End: 2021-02-24
Payer: COMMERCIAL

## 2021-02-24 PROCEDURE — 97112 NEUROMUSCULAR REEDUCATION: CPT

## 2021-02-24 PROCEDURE — 97140 MANUAL THERAPY 1/> REGIONS: CPT

## 2021-02-24 PROCEDURE — 97110 THERAPEUTIC EXERCISES: CPT

## 2021-02-24 NOTE — FLOWSHEET NOTE
Physical Therapy Daily Treatment Note  Date:  2021    Patient Name:  Haley Aguayo    :  1955  MRN: [id number]    Restrictions/Precautions:  Restrictions/Precautions  Restrictions/Precautions: Fall Risk(Low)  Position Activity Restriction  Other position/activity restrictions: Rotator Cuff Repair Protocol  Sling:  Medium tear:  4 wks at all times, then 2 wks for activities  DOS:  20         Motion:  POD 1 (20):  Pendulum exercises, elbow/wrist/hand ROM  POD 14 (20): Supine PROM:  Flex to 130, abd to 40, ER to tolerance w/45 degree abd Start stationary bike  POD 21 (20):  P/AAROM:  Flex to 150, abd to 70, ER to tolerance with 45 degree abd  POD 42 (20):  P/AAROM:  Flex to 180, abd and ER to tolerance                  Begin active ROM, progress to full AROM as comfort allows    Strengthenin wk (Only small/medium tears): Gentle strengthening, isometrics, pain-free  10 wks:  Initiate full strengthening program; theraband ER/IR, side-lying ER;                Deltoid lateral raise; Elyssa-scapular strengthening; ;Progressive resistance  Pertinent Medical History:      Medical/Treatment Diagnosis Information:  · Diagnosis: s/p Left Rotator Cuff Repair Z98.890  · Treatment Diagnosis: s/p L Rotator Cuff Repair, L shoulder Pain, Decreased Functional Mobility    Insurance/Certification information:  PT Insurance Information: [address] [place] [address], allowed 30 PT visits per year, no prior authorization required  Physician Information:  Referring Practitioner: Radha Casanova MD  Plan of care signed (Y/N): Cosign received     Visit# / total visits:    Pain level: 0/10 at best, 2-3/10 at worst     Functional Outcomes Measure:  Test: QuickDASH  Score:21 = 22.73% Disability  (20)    Progress Note: [x]  Yes  []  No  Next due by: Visit #10      History of Injury:Patient reports a history of Left shoulder pain x 1 year. \"If I did anything physical with my arm it would hurt. I couldn't sleep some nights\"  Patient saw Dr. Cedric Black, had x-ray, MRI, RTC tear, 2 bone spurs, arthritis in shoulder. Patient underwent arthroscopic RTC repair, Juno, chondroplasty, labral debridement at [facility] on 11/4/20. Patient reports  pain primarilly after block wore off, but has improved. Patient has used pain medication and/or ibuprofen prn, sparingly. Patient is not able to sleep, patient is using R UE for ADLs. Patient is limiting activities due to L shoulder. patient is off work at present. Subjective:   Patient saw MD, advised to continue PT to increase strength and ROM. Patient continues to have difficulty with CON shoulder elevation. 1/29/21 Pt stated \"everything is pretty good but\" pt noted difficulty reaching arm overhead actively. 2/1/21:   Patient reports shoulder is doing \" a little better but still stiff. \"  2/5/21:  Just has difficulty raising arm  2/8/21  No pain just stiffness  2/10/21:  Nothing new. Is stiff this morning  2/15/21:  Patient reports shoulder is improving, but continues to be stiff.  2/22:  Getting better every day. Sleeping better at night.  Still tends to guard it at times  2/24/21:  Patient reports shoulder continues to be stiff, tight, but is improving    Objective:   Observation: decreased mobility G-H joint, inf and pos; mod tightness all portions of rotator cuff   Test measurements:    DATE:                              12/7/20 1/20/21   PROM AROM AROM PROM AROM    (Left) (Left) Right (Left) (Left)   Flexion 130 100 165 145 110   Extension 50 45 65 55 50   Abduction 100 90 165 130 100   Internal Rotation 70 60 80 70 60   External Rotation 30 20 70 35 20           Exercises:  Exercise/Equipment Resistance/Repetitions Other comments        Pulley  Flexion, x 20  IR with Ext, x 20  ER with flex, x 20 Sitting  standing   UE [place] Flexion, 1 x 15 reps standing    Scaption, 1 x 10 reps standing   Theraslide Lat Pull Down, 1 x 15 reps, orange  Row, 1 x 15 reps, orange  Ext, 1 x 15 reps, orange  Fencing, 1 x 15 reps, orange  IR, 1 x 15 reps, orange  ER, 1 x 15 reps, orange standing   Towel stretch IR, ER 30 sec x 3                                          Other Therapeutic Activities:  11/11/20:  Discussed at length anatomy and biomechanics of the shoulder, muscle reeducation, motor recruitment. Home Exercise Program:  11/11/20:   Patient instructed in lower trap sets, Codman's pendulum, elbow flex/ext, pron/sup, wrist flex/ext, fist ; written instructions with pictures issued, patient able to demonstrate exercises. 11/23/20:   Patient instructed in flexion and scaption table slides written instructions with pictures issued, patient able to demonstrate exercises. 11/25/20:   Patient instructed in isometric shoulder flex, ext ; written instructions with pictures issued, patient able to demonstrate exercises. 11/30/20: isometric shoulder abd and add  12/10/20:   Patient instructed in isometric IR, ER ; written instructions with pictures issued, patient able to demonstrate exercises. 12/14/20:   Patient instructed in doorway stretch ; written instructions with pictures issued, patient able to demonstrate exercises. 12/21/20:   Patient instructed in wall slide with step flex and scaption ; written instructions with pictures issued, patient able to demonstrate exercises. 12/24/20 theraband lat pulls, rows, IR, ER, shoulder extension, fencing  1/13/21:   Patient instructed in wall push up; written instructions with pictures issued, patient able to demonstrate exercises. 1/26/21:   Patient instructed in CON flex with cane assist, ECC flex independently ; written instructions with pictures issued, patient able to demonstrate exercises. 1/29/21 serratus punches, alphabet  2/1/21:   Patient instructed in IR with ext and ER with flex on pulleys ; written instructions with pictures issued, patient able to demonstrate exercises.   2/24/21:  Patient Continue per plan of care [] Alter current plan (see comments)  [] Plan of care initiated [] Hold pending MD visit [] Discharge  Note: If patient does not return for scheduled/ recommended follow up visits, this note will serve as a discharge from care along with most recent update on progress.       Electronically signed by:  Sunil Grier, [address] [facility]

## 2021-03-01 ENCOUNTER — HOSPITAL ENCOUNTER (OUTPATIENT)
Dept: PHYSICAL THERAPY | Age: 66
Setting detail: THERAPIES SERIES
Discharge: HOME | End: 2021-03-01
Payer: COMMERCIAL

## 2021-03-01 PROCEDURE — 97140 MANUAL THERAPY 1/> REGIONS: CPT

## 2021-03-01 PROCEDURE — 97110 THERAPEUTIC EXERCISES: CPT

## 2021-03-01 NOTE — PROGRESS NOTES
Outpatient Physical Therapy  [] [facility]    Phone: [phone]   Fax: [phone]   [] [facility]  Phone: [phone]   Fax: [phone]  [x] [facility]              Phone: [phone]   Fax: [phone]     Physical Therapy Progress/Discharge Note  Date: 3/1/2021        Patient Name:  Anton Coburn    :  1955  MRN: [id number]  Pertinent Medical History:       Medical/Treatment Diagnosis Information:  · Diagnosis: s/p Left Rotator Cuff Repair Z98.890  · Treatment Diagnosis: s/p L Rotator Cuff Repair, L shoulder Pain, Decreased Functional Mobility     Insurance/Certification information:  PT Insurance Information: [address] [place] [address], allowed 30 PT visits per year, no prior authorization required  Physician Information:  Referring Practitioner: Richar Sinha MD  Plan of care signed (Y/N): Cosign received      Visit# / total visits:   (14 visits in )  Pain level:      0/10 at most of the time, 2-3/10 at worst at end ROM when stretching     Functional Outcomes Measure:  Test: QuickDASH  Score:21 = 22.73% Disability  (20)    Time Period for Report:  20 - 3/1/21  Cancels/No-shows to date:  None    Plan of Care/Treatment to date:  [x] Therapeutic Exercise    [] Modalities:  [x] Therapeutic Activity     [] Ultrasound  [] Electrical Stimulation  [] Gait Training      [] Cervical Traction    [] Lumbar Traction  [x] Neuromuscular Re-education  [] Cold/hotpack [] Iontophoresis  [x] Instruction in HEP      Other:  [x] Manual Therapy       []    [] Aquatic Therapy       []                          Significant Findings At Last Visit/Comments:    Subjective:    Patient reports shoulder is doing good overall, but continues to be tight and stiff.   Addendum 3/3/21:  Patient called, cancelled remaining visits after seeing MD.  Patient instructed to continue with HEP, d/c PT.   Objective:  · Observation: decreased mobility G-H joint, inf and pos; mod tightness all portions of rotator cuff  · Test measurements:    DATE:                              12/7/20              1/20/21               3/1/21    PROM AROM AROM PROM AROM AROM     (Left) (Left) Right (Left) (Left) (Left)   Flexion 130 100 165 145 110 140   Extension 50 45 65 55 50 55   Abduction 100 90 165 130 100 130   Internal Rotation 70 60 80 70 60 75   External Rotation 30 20 70 35 20 40        Assessment:   Summary: Patient has been seen for 30 PT visits with significant progress noted, however he continues to have tightness and stiffness at end ROM. Patient has difficulty lifting or lowering objects from higher positions. Progression Towards Functional goals:  [x] Patient is progressing as expected towards functional goals listed. [] Progression is slowed due to complexities listed. [x] Progression has been slowed due to co-morbidities.   [] Plan just implemented, too soon to assess goals progression  [] Other:    Goals:  Short term goals  Time Frame for Short term goals: 4-6 weeks  Short term goal 1: Pain </= 0/10 at rest, 2/10 with activity  Met  Short term goal 2: Patient able to demonstrate AROM L shoulder WFL  Improved but not met  Short term goal 3: Patient able to demonstrate strength L shoulder >/= 3+/5  Met  Short term goal 4: Patient able to perform light chores without increased symptoms  Met  Short term goal 5: Patient independent with written home exercise program Ongoing         Rehab Potential: [] Excellent [x] Good [] Fair  [] Poor     Goal Status:  [x] Achieved [x] Partially Achieved  [] Not Achieved     Current Frequency/Duration:  # Days per week: [] 1 day # Weeks: [] 1 week [x] 4 weeks      [x] 2 days   [] 2 weeks [] 5 weeks      [] 3 days   [] 3 weeks [] 6 weeks     Patient Status: [x] Continue per initial plan of Care     [] Patient now discharged     [x] Additional visits requested, Please re-certify for additional visits:      Requested frequency/duration:  1-2X/week for 4-6 weeks  3/3/21:  Patient now discharged after MD visit. Electronically signed by:  Monroe Evans, PT [id number]    If you have any questions or concerns, please don't hesitate to call.   Thank you for your referral.    Physician Signature:________________________________Date:__________________  By signing above, therapists plan is approved by physician

## 2021-03-01 NOTE — FLOWSHEET NOTE
Physical Therapy Daily Treatment Note  Date:  3/1/2021    Patient Name:  Lizbet Caro    :  1955  MRN: [id number]    Restrictions/Precautions:  Restrictions/Precautions  Restrictions/Precautions: Fall Risk(Low)  Position Activity Restriction  Other position/activity restrictions: Rotator Cuff Repair Protocol  Sling:  Medium tear:  4 wks at all times, then 2 wks for activities  DOS:  20         Motion:  POD 1 (20):  Pendulum exercises, elbow/wrist/hand ROM  POD 14 (20): Supine PROM:  Flex to 130, abd to 40, ER to tolerance w/45 degree abd Start stationary bike  POD 21 (20):  P/AAROM:  Flex to 150, abd to 70, ER to tolerance with 45 degree abd  POD 42 (20):  P/AAROM:  Flex to 180, abd and ER to tolerance                  Begin active ROM, progress to full AROM as comfort allows    Strengthenin wk (Only small/medium tears): Gentle strengthening, isometrics, pain-free  10 wks:  Initiate full strengthening program; theraband ER/IR, side-lying ER;                Deltoid lateral raise; Elyssa-scapular strengthening; ;Progressive resistance  Pertinent Medical History:      Medical/Treatment Diagnosis Information:  · Diagnosis: s/p Left Rotator Cuff Repair Z98.890  · Treatment Diagnosis: s/p L Rotator Cuff Repair, L shoulder Pain, Decreased Functional Mobility    Insurance/Certification information:  PT Insurance Information: [address] [place] [address], allowed 30 PT visits per year, no prior authorization required  Physician Information:  Referring Practitioner: Samm Tanner MD  Plan of care signed (Y/N):  Cosign received     Visit# / total visits:   (14 visits in )  Pain level: 0/10 at most of the time, 2-3/10 at worst at end ROM when stretching    Functional Outcomes Measure:  Test: QuickDASH  Score:21 = 22.73% Disability  (20)    Progress Note: [x]  Yes  []  No  Next due by: Visit #10      History of Injury:Patient reports a history of Left shoulder pain x 1 year. \"If I did anything physical with my arm it would hurt. I couldn't sleep some nights\"  Patient saw Dr. Nicolas Rios, had x-ray, MRI, RTC tear, 2 bone spurs, arthritis in shoulder. Patient underwent arthroscopic RTC repair, Juno, chondroplasty, labral debridement at [facility] on 11/4/20. Patient reports  pain primarilly after block wore off, but has improved. Patient has used pain medication and/or ibuprofen prn, sparingly. Patient is not able to sleep, patient is using R UE for ADLs. Patient is limiting activities due to L shoulder. patient is off work at present. Subjective:    Patient reports shoulder is doing good overall, but continues to be tight and stiff. Objective:   Observation: decreased mobility G-H joint, inf and pos; mod tightness all portions of rotator cuff   Test measurements:    DATE:                              12/7/20              1/20/21               3/1/21   PROM AROM AROM PROM AROM AROM    (Left) (Left) Right (Left) (Left) (Left)   Flexion 130 100 165 145 110 140   Extension 50 45 65 55 50 55   Abduction 100 90 165 130 100 130   Internal Rotation 70 60 80 70 60 75   External Rotation 30 20 70 35 20 40           Exercises:  Exercise/Equipment Resistance/Repetitions Other comments        Pulley  Flexion, x 20  IR with Ext, x 20  ER with flex, x 20 Sitting  standing   UE [place] Flexion, 1 x 15 reps standing    Scaption, 1 x 10 reps standing   Theraslide Lat Pull Down, 1 x 15 reps, orange  Row, 1 x 15 reps, orange  Ext, 1 x 15 reps, orange  Fencing, 1 x 15 reps, orange  IR, 1 x 15 reps, orange  ER, 1 x 15 reps, orange standing   Towel stretch IR, ER 30 sec x 3                                          Other Therapeutic Activities:  11/11/20:  Discussed at length anatomy and biomechanics of the shoulder, muscle reeducation, motor recruitment.     Home Exercise Program:  11/11/20:   Patient instructed in lower trap sets, Codman's pendulum, elbow flex/ext, pron/sup, wrist flex/ext, fist ; written instructions with pictures issued, patient able to demonstrate exercises. 11/23/20:   Patient instructed in flexion and scaption table slides written instructions with pictures issued, patient able to demonstrate exercises. 11/25/20:   Patient instructed in isometric shoulder flex, ext ; written instructions with pictures issued, patient able to demonstrate exercises. 11/30/20: isometric shoulder abd and add  12/10/20:   Patient instructed in isometric IR, ER ; written instructions with pictures issued, patient able to demonstrate exercises. 12/14/20:   Patient instructed in doorway stretch ; written instructions with pictures issued, patient able to demonstrate exercises. 12/21/20:   Patient instructed in wall slide with step flex and scaption ; written instructions with pictures issued, patient able to demonstrate exercises. 12/24/20 theraband lat pulls, rows, IR, ER, shoulder extension, fencing  1/13/21:   Patient instructed in wall push up; written instructions with pictures issued, patient able to demonstrate exercises. 1/26/21:   Patient instructed in CON flex with cane assist, ECC flex independently ; written instructions with pictures issued, patient able to demonstrate exercises. 1/29/21 serratus punches, alphabet  2/1/21:   Patient instructed in IR with ext and ER with flex on pulleys ; written instructions with pictures issued, patient able to demonstrate exercises. 2/24/21:  Patient instructed in teres minor and infra stretches ; written instructions with pictures issued, patient able to demonstrate exercises. Manual Treatments:  Soft tissue mobilization to L upper quarter, with emphasis on periscapular musculature, all portions of rotator cuff. Scapular mobilization sup/inf, med/lat, grade 2. G-H inf and post mob in neutral, 90, end range, grade 2-3. Manual Stretch of all portions of rotator cuff. Manual stretch all motions, with 30 sec holds at end ROM, x 3-5 reps. Con/ECC shoulder flex, scap, from 90 to end ROM with min/mod resistance, x 10 reps each. Modalities:     Progression Towards Functional goals:  [x] Patient is progressing as expected towards functional goals listed. [] Progression is slowed due to complexities listed. [] Progression has been slowed due to co-morbidities. [] Plan just implemented, too soon to assess goals progression  [] Other:    Charges: Therapeutic Exercise:  [x] (61987) Provided verbal/tactile cueing for activities to restore or maintain strength, flexibility, endurance, ROM for improvements with self-care, mobility, lifting and ambulation. Neuromuscular Re-Education  [] (30494) Provided verbal/tactile cueing for activities to restore or maintain balance, coordination, kinesthetic sense, posture, motor skill, proprioception for self-care, mobility, lifting, and ambulation. Therapeutic Activities:    [] (30361) Provided verbal/tactile cueing to address functional limitations related to loss of mobility, strength, balance, and coordination.      Gait Training:  [] (58765) Provided training and instruction to the patient for proper postural muscle recruitment and positioning with ambulation re-education     Home Exercise Program:    [x] (56132) Reviewed/Progressed HEP activities related to strengthening, flexibility, endurance, ROM for functional self-care, mobility, lifting and ambulation   [] (30913) Reviewed/Progressed HEP activities related to improving balance, coordination, kinesthetic sense, posture, motor skill, proprioception for self-care, mobility, lifting, and ambulation      Manual Treatments:  MFR / STM / Oscillations-Mobs:  G-I, II, III, IV / Manipulation / MLD  [x] (61518) Provided manual therapy to mobilize  soft tissue/joints/fluid for the purpose of modulating pain, promoting relaxation, increasing ROM, reducing/eliminating soft tissue swelling/inflammation/restriction, improving soft tissue extensibility and allowing for proper ROM for normal function with self- care, mobility, lifting and ambulation. Timed Code Treatment Minutes: 45   Total Treatment Minutes: 45     [] EVAL (LOW) 58582   [] EVAL (MOD) 42083   [] EVAL (HIGH) 78366   [] RE-EVAL   [x] TE (31750) x     [] Aquatic (51033) x  [] NMR (92182)   x  [] Aquatic Group (38946) x  [x] Manual (23751) x  2  [] Ultrasound (12090) x  [] TA (47775) x  [] Mech Traction (87903)  [] Ionto (45489)           [] ES (un) (69677):   [] Vasopump (62982) [] Other:      Assessment  [x] Patient tolerated treatment well [] Patient limited by fatigue  [] Patient limited by pain  [] Patient limited by other medical complications  [] Other:     Prognosis: [x] Good [] Fair  [] Poor    Goals:    Short term goals  Time Frame for Short term goals: 4-6 weeks  Short term goal 1: Pain </= 0/10 at rest, 2/10 with activity  Met  Short term goal 2: Patient able to demonstrate AROM L shoulder WFL  Improved but not met  Short term goal 3: Patient able to demonstrate strength L shoulder >/= 3+/5  Met  Short term goal 4: Patient able to perform light chores without increased symptoms  Met  Short term goal 5: Patient independent with written home exercise program Ongoing           Patient Requires Follow-up: [x] Yes  [] No    Plan:  Monitor response. Focus on CON/ECC strengthening of shoulder flex, scaption, PROM at end range flex and scap. Progress note sent to MD.  [x] Continue per plan of care [] Alter current plan (see comments)  [] Plan of care initiated [] Hold pending MD visit [] Discharge  Note: If patient does not return for scheduled/ recommended follow up visits, this note will serve as a discharge from care along with most recent update on progress.       Electronically signed by:  Jackie Huitron, [address] [facility]

## 2021-03-02 ENCOUNTER — OFFICE VISIT (OUTPATIENT)
Dept: ORTHOPEDIC SURGERY | Age: 66
End: 2021-03-02
Payer: COMMERCIAL

## 2021-03-02 VITALS — BODY MASS INDEX: 30.74 KG/M2 | HEIGHT: 68 IN | WEIGHT: 202.8 LBS

## 2021-03-02 DIAGNOSIS — Z98.890 S/P LEFT ROTATOR CUFF REPAIR: Primary | ICD-10-CM

## 2021-03-02 PROCEDURE — 99213 OFFICE O/P EST LOW 20 MIN: CPT | Performed by: ORTHOPAEDIC SURGERY

## 2021-03-02 PROCEDURE — 3017F COLORECTAL CA SCREEN DOC REV: CPT | Performed by: ORTHOPAEDIC SURGERY

## 2021-03-02 PROCEDURE — 1036F TOBACCO NON-USER: CPT | Performed by: ORTHOPAEDIC SURGERY

## 2021-03-02 PROCEDURE — G8428 CUR MEDS NOT DOCUMENT: HCPCS | Performed by: ORTHOPAEDIC SURGERY

## 2021-03-02 PROCEDURE — G8417 CALC BMI ABV UP PARAM F/U: HCPCS | Performed by: ORTHOPAEDIC SURGERY

## 2021-03-02 PROCEDURE — G8484 FLU IMMUNIZE NO ADMIN: HCPCS | Performed by: ORTHOPAEDIC SURGERY

## 2021-03-02 NOTE — PROGRESS NOTES
Anton Coburn  <[id number]>  March 2, 2021    Chief Complaint   Patient presents with    Follow-up     11/4/20 left shoulder rotator cuff repair       History: The patient is approximately 4 months status post left shoulder rotator cuff repair. His pain has improved significantly. He occasionally has some soreness after therapy. The patient does report chronic stiffness in his shoulders dating all the way back to his teenage years. He denies any numbness or tingling. He denies any fever or chills. He is not taking anything for pain. The patient's  past medical history, medications, allergies,  family history, social history, and review of systems have been reviewed, and dated and are recorded in the chart. Ht 5' 8\" (1.727 m)   Wt 202 lb 12.8 oz (92 kg)   BMI 30.84 kg/m²     Physical:  Mr. Anton Coburn appears well, he is in no apparent distress, he demonstrates appropriate mood & affect. He is alert and oriented to person, place and time. He has mild swelling. He is neurovascularly intact distally. Sensation is intact in the axillary nerve distribution. left shoulder range of motion: 160 degrees abduction, 165 degrees forward flexion, 35 degrees external rotation and internal rotation to L3. He has minimal pain with light range of motion of the shoulder. The incisions are clean, dry and intact and without erythema. Strength in the shoulder is: 4+/5-  Abduction and 4+ to 5/5-  external rotation. Impression: status post left shoulder arthroscopy, subacromial decompression and lux and rotator cuff repair    Plan: At this time, we discussed the natural history and recovery after rotator cuff repair. He continues to improve. He is aware that he will need to continue home exercises for the next few months. He may discontinue the formal physical therapy program.  The patient may gradually resume regular activities. He will follow-up with me in 6 weeks and we will reassess him then.

## 2021-03-03 ENCOUNTER — APPOINTMENT (OUTPATIENT)
Dept: PHYSICAL THERAPY | Age: 66
End: 2021-03-03
Payer: COMMERCIAL

## 2022-02-09 ENCOUNTER — APPOINTMENT (OUTPATIENT)
Dept: GENERAL RADIOLOGY | Age: 67
End: 2022-02-09
Payer: MEDICARE

## 2022-02-09 ENCOUNTER — HOSPITAL ENCOUNTER (OUTPATIENT)
Age: 67
Setting detail: OBSERVATION
Discharge: HOME | End: 2022-02-10
Attending: STUDENT IN AN ORGANIZED HEALTH CARE EDUCATION/TRAINING PROGRAM | Admitting: INTERNAL MEDICINE
Payer: MEDICARE

## 2022-02-09 DIAGNOSIS — R07.9 ACUTE CHEST PAIN: Primary | ICD-10-CM

## 2022-02-09 LAB
ANION GAP SERPL CALCULATED.3IONS-SCNC: 12 MMOL/L (ref 3–16)
BASOPHILS ABSOLUTE: 0.1 K/UL (ref 0–0.2)
BASOPHILS RELATIVE PERCENT: 0.8 %
BUN BLDV-MCNC: 19 MG/DL (ref 7–20)
CALCIUM SERPL-MCNC: 10 MG/DL (ref 8.3–10.6)
CHLORIDE BLD-SCNC: 100 MMOL/L (ref 99–110)
CO2: 26 MMOL/L (ref 21–32)
CREAT SERPL-MCNC: 1.2 MG/DL (ref 0.8–1.3)
EKG ATRIAL RATE: 65 BPM
EKG DIAGNOSIS: NORMAL
EKG P AXIS: 44 DEGREES
EKG P-R INTERVAL: 186 MS
EKG Q-T INTERVAL: 414 MS
EKG QRS DURATION: 112 MS
EKG QTC CALCULATION (BAZETT): 430 MS
EKG R AXIS: -32 DEGREES
EKG T AXIS: 22 DEGREES
EKG VENTRICULAR RATE: 65 BPM
EOSINOPHILS ABSOLUTE: 0.3 K/UL (ref 0–0.6)
EOSINOPHILS RELATIVE PERCENT: 4 %
GFR AFRICAN AMERICAN: >60
GFR NON-AFRICAN AMERICAN: >60
GLUCOSE BLD-MCNC: 152 MG/DL (ref 70–99)
HCT VFR BLD CALC: 40.7 % (ref 40.5–52.5)
HEMOGLOBIN: 13.8 G/DL (ref 13.5–17.5)
LYMPHOCYTES ABSOLUTE: 2.1 K/UL (ref 1–5.1)
LYMPHOCYTES RELATIVE PERCENT: 26.8 %
MCH RBC QN AUTO: 30.6 PG (ref 26–34)
MCHC RBC AUTO-ENTMCNC: 34 G/DL (ref 31–36)
MCV RBC AUTO: 90.1 FL (ref 80–100)
MONOCYTES ABSOLUTE: 0.5 K/UL (ref 0–1.3)
MONOCYTES RELATIVE PERCENT: 6.4 %
NEUTROPHILS ABSOLUTE: 4.8 K/UL (ref 1.7–7.7)
NEUTROPHILS RELATIVE PERCENT: 62 %
PDW BLD-RTO: 13 % (ref 12.4–15.4)
PLATELET # BLD: 318 K/UL (ref 135–450)
PMV BLD AUTO: 7 FL (ref 5–10.5)
POTASSIUM REFLEX MAGNESIUM: 3.8 MMOL/L (ref 3.5–5.1)
RBC # BLD: 4.52 M/UL (ref 4.2–5.9)
SODIUM BLD-SCNC: 138 MMOL/L (ref 136–145)
TROPONIN: <0.01 NG/ML
TROPONIN: <0.01 NG/ML
WBC # BLD: 7.8 K/UL (ref 4–11)

## 2022-02-09 PROCEDURE — 36415 COLL VENOUS BLD VENIPUNCTURE: CPT

## 2022-02-09 PROCEDURE — 84484 ASSAY OF TROPONIN QUANT: CPT

## 2022-02-09 PROCEDURE — 85025 COMPLETE CBC W/AUTO DIFF WBC: CPT

## 2022-02-09 PROCEDURE — G0378 HOSPITAL OBSERVATION PER HR: HCPCS

## 2022-02-09 PROCEDURE — 6370000000 HC RX 637 (ALT 250 FOR IP): Performed by: STUDENT IN AN ORGANIZED HEALTH CARE EDUCATION/TRAINING PROGRAM

## 2022-02-09 PROCEDURE — 99285 EMERGENCY DEPT VISIT HI MDM: CPT

## 2022-02-09 PROCEDURE — 2580000003 HC RX 258: Performed by: INTERNAL MEDICINE

## 2022-02-09 PROCEDURE — 71046 X-RAY EXAM CHEST 2 VIEWS: CPT

## 2022-02-09 PROCEDURE — 93010 ELECTROCARDIOGRAM REPORT: CPT | Performed by: INTERNAL MEDICINE

## 2022-02-09 PROCEDURE — 80048 BASIC METABOLIC PNL TOTAL CA: CPT

## 2022-02-09 PROCEDURE — 93005 ELECTROCARDIOGRAM TRACING: CPT | Performed by: STUDENT IN AN ORGANIZED HEALTH CARE EDUCATION/TRAINING PROGRAM

## 2022-02-09 RX ORDER — ASPIRIN 81 MG/1
81 TABLET, CHEWABLE ORAL DAILY
Status: DISCONTINUED | OUTPATIENT
Start: 2022-02-10 | End: 2022-02-10 | Stop reason: HOSPADM

## 2022-02-09 RX ORDER — ONDANSETRON 4 MG/1
4 TABLET, ORALLY DISINTEGRATING ORAL EVERY 8 HOURS PRN
Status: DISCONTINUED | OUTPATIENT
Start: 2022-02-09 | End: 2022-02-10 | Stop reason: HOSPADM

## 2022-02-09 RX ORDER — SODIUM CHLORIDE 0.9 % (FLUSH) 0.9 %
5-40 SYRINGE (ML) INJECTION EVERY 12 HOURS SCHEDULED
Status: DISCONTINUED | OUTPATIENT
Start: 2022-02-09 | End: 2022-02-10 | Stop reason: HOSPADM

## 2022-02-09 RX ORDER — ONDANSETRON 2 MG/ML
4 INJECTION INTRAMUSCULAR; INTRAVENOUS EVERY 6 HOURS PRN
Status: DISCONTINUED | OUTPATIENT
Start: 2022-02-09 | End: 2022-02-10 | Stop reason: HOSPADM

## 2022-02-09 RX ORDER — METOPROLOL SUCCINATE 50 MG/1
50 TABLET, EXTENDED RELEASE ORAL DAILY
Status: DISCONTINUED | OUTPATIENT
Start: 2022-02-10 | End: 2022-02-10 | Stop reason: HOSPADM

## 2022-02-09 RX ORDER — PRAVASTATIN SODIUM 40 MG
40 TABLET ORAL DAILY
Status: DISCONTINUED | OUTPATIENT
Start: 2022-02-10 | End: 2022-02-10 | Stop reason: HOSPADM

## 2022-02-09 RX ORDER — LISINOPRIL 10 MG/1
10 TABLET ORAL DAILY
Status: DISCONTINUED | OUTPATIENT
Start: 2022-02-10 | End: 2022-02-10 | Stop reason: HOSPADM

## 2022-02-09 RX ORDER — ASPIRIN 81 MG/1
324 TABLET, CHEWABLE ORAL ONCE
Status: COMPLETED | OUTPATIENT
Start: 2022-02-09 | End: 2022-02-09

## 2022-02-09 RX ORDER — NITROGLYCERIN 0.4 MG/1
0.4 TABLET SUBLINGUAL EVERY 5 MIN PRN
Status: DISCONTINUED | OUTPATIENT
Start: 2022-02-09 | End: 2022-02-10 | Stop reason: HOSPADM

## 2022-02-09 RX ORDER — ACETAMINOPHEN 325 MG/1
650 TABLET ORAL EVERY 6 HOURS PRN
Status: DISCONTINUED | OUTPATIENT
Start: 2022-02-09 | End: 2022-02-10 | Stop reason: HOSPADM

## 2022-02-09 RX ORDER — HYDROCHLOROTHIAZIDE 25 MG/1
25 TABLET ORAL DAILY
Status: DISCONTINUED | OUTPATIENT
Start: 2022-02-10 | End: 2022-02-10 | Stop reason: HOSPADM

## 2022-02-09 RX ORDER — SODIUM CHLORIDE 9 MG/ML
25 INJECTION, SOLUTION INTRAVENOUS PRN
Status: DISCONTINUED | OUTPATIENT
Start: 2022-02-09 | End: 2022-02-10 | Stop reason: HOSPADM

## 2022-02-09 RX ORDER — POLYETHYLENE GLYCOL 3350 17 G/17G
17 POWDER, FOR SOLUTION ORAL DAILY PRN
Status: DISCONTINUED | OUTPATIENT
Start: 2022-02-09 | End: 2022-02-10 | Stop reason: HOSPADM

## 2022-02-09 RX ORDER — ACETAMINOPHEN 650 MG/1
650 SUPPOSITORY RECTAL EVERY 6 HOURS PRN
Status: DISCONTINUED | OUTPATIENT
Start: 2022-02-09 | End: 2022-02-10 | Stop reason: HOSPADM

## 2022-02-09 RX ORDER — SODIUM CHLORIDE 0.9 % (FLUSH) 0.9 %
5-40 SYRINGE (ML) INJECTION PRN
Status: DISCONTINUED | OUTPATIENT
Start: 2022-02-09 | End: 2022-02-10 | Stop reason: HOSPADM

## 2022-02-09 RX ADMIN — NITROGLYCERIN 0.4 MG: 0.4 TABLET, ORALLY DISINTEGRATING SUBLINGUAL at 16:37

## 2022-02-09 RX ADMIN — SODIUM CHLORIDE, PRESERVATIVE FREE 10 ML: 5 INJECTION INTRAVENOUS at 22:16

## 2022-02-09 RX ADMIN — ASPIRIN 81 MG 324 MG: 81 TABLET ORAL at 16:02

## 2022-02-09 ASSESSMENT — PAIN DESCRIPTION - LOCATION
LOCATION: CHEST;ARM
LOCATION: CHEST

## 2022-02-09 ASSESSMENT — PAIN DESCRIPTION - FREQUENCY
FREQUENCY: INTERMITTENT
FREQUENCY: CONTINUOUS

## 2022-02-09 ASSESSMENT — PAIN DESCRIPTION - DESCRIPTORS
DESCRIPTORS: ACHING
DESCRIPTORS: TINGLING

## 2022-02-09 ASSESSMENT — PAIN DESCRIPTION - ORIENTATION
ORIENTATION: LEFT
ORIENTATION: LEFT

## 2022-02-09 ASSESSMENT — PAIN DESCRIPTION - PAIN TYPE
TYPE: ACUTE PAIN
TYPE: ACUTE PAIN

## 2022-02-09 ASSESSMENT — PAIN SCALES - GENERAL
PAINLEVEL_OUTOF10: 0
PAINLEVEL_OUTOF10: 4
PAINLEVEL_OUTOF10: 1
PAINLEVEL_OUTOF10: 0

## 2022-02-09 NOTE — ED NOTES
Patient rates his chest pain 3/10. Gave 1 nitro.  B/p 145/85 hr 532 [facility]  02/09/22 1640

## 2022-02-09 NOTE — ED NOTES
After first nitro patient's pain is down 1/10 B/p 113/73 hr 60.  Will update Dr. Neelam Ceja, RN  02/09/22 9795

## 2022-02-09 NOTE — ED NOTES
Patient will be going to room 4113 at [facility] \[facility] [place] [facility]\"" AT [facility]. Waiting on room to be cleaned.  Will update patient      Yvette Sharma RN  02/09/22 9267

## 2022-02-10 VITALS
OXYGEN SATURATION: 97 % | RESPIRATION RATE: 16 BRPM | DIASTOLIC BLOOD PRESSURE: 78 MMHG | HEIGHT: 68 IN | TEMPERATURE: 97.5 F | HEART RATE: 61 BPM | BODY MASS INDEX: 30.16 KG/M2 | SYSTOLIC BLOOD PRESSURE: 145 MMHG | WEIGHT: 199 LBS

## 2022-02-10 LAB
CHOLESTEROL, TOTAL: 130 MG/DL (ref 0–199)
EKG ATRIAL RATE: 55 BPM
EKG DIAGNOSIS: NORMAL
EKG P AXIS: 49 DEGREES
EKG P-R INTERVAL: 200 MS
EKG Q-T INTERVAL: 442 MS
EKG QRS DURATION: 110 MS
EKG QTC CALCULATION (BAZETT): 422 MS
EKG R AXIS: -27 DEGREES
EKG T AXIS: 12 DEGREES
EKG VENTRICULAR RATE: 55 BPM
GLUCOSE BLD-MCNC: 149 MG/DL (ref 70–99)
GLUCOSE BLD-MCNC: 163 MG/DL (ref 70–99)
HCT VFR BLD CALC: 40.5 % (ref 40.5–52.5)
HDLC SERPL-MCNC: 30 MG/DL (ref 40–60)
HEMOGLOBIN: 14 G/DL (ref 13.5–17.5)
LDL CHOLESTEROL CALCULATED: 78 MG/DL
LV EF: 69 %
LVEF MODALITY: NORMAL
MCH RBC QN AUTO: 31 PG (ref 26–34)
MCHC RBC AUTO-ENTMCNC: 34.5 G/DL (ref 31–36)
MCV RBC AUTO: 89.8 FL (ref 80–100)
PDW BLD-RTO: 13.5 % (ref 12.4–15.4)
PERFORMED ON: ABNORMAL
PERFORMED ON: ABNORMAL
PLATELET # BLD: 241 K/UL (ref 135–450)
PMV BLD AUTO: 6.8 FL (ref 5–10.5)
RBC # BLD: 4.51 M/UL (ref 4.2–5.9)
TRIGL SERPL-MCNC: 108 MG/DL (ref 0–150)
TROPONIN: <0.01 NG/ML
VLDLC SERPL CALC-MCNC: 22 MG/DL
WBC # BLD: 7 K/UL (ref 4–11)

## 2022-02-10 PROCEDURE — 85027 COMPLETE CBC AUTOMATED: CPT

## 2022-02-10 PROCEDURE — 78452 HT MUSCLE IMAGE SPECT MULT: CPT

## 2022-02-10 PROCEDURE — A9502 TC99M TETROFOSMIN: HCPCS | Performed by: INTERNAL MEDICINE

## 2022-02-10 PROCEDURE — 93010 ELECTROCARDIOGRAM REPORT: CPT | Performed by: INTERNAL MEDICINE

## 2022-02-10 PROCEDURE — 3430000000 HC RX DIAGNOSTIC RADIOPHARMACEUTICAL: Performed by: INTERNAL MEDICINE

## 2022-02-10 PROCEDURE — 93005 ELECTROCARDIOGRAM TRACING: CPT | Performed by: INTERNAL MEDICINE

## 2022-02-10 PROCEDURE — 84484 ASSAY OF TROPONIN QUANT: CPT

## 2022-02-10 PROCEDURE — G0378 HOSPITAL OBSERVATION PER HR: HCPCS

## 2022-02-10 PROCEDURE — 36415 COLL VENOUS BLD VENIPUNCTURE: CPT

## 2022-02-10 PROCEDURE — 2580000003 HC RX 258: Performed by: INTERNAL MEDICINE

## 2022-02-10 PROCEDURE — 6370000000 HC RX 637 (ALT 250 FOR IP): Performed by: NURSE PRACTITIONER

## 2022-02-10 PROCEDURE — 93017 CV STRESS TEST TRACING ONLY: CPT

## 2022-02-10 PROCEDURE — 80061 LIPID PANEL: CPT

## 2022-02-10 PROCEDURE — 6370000000 HC RX 637 (ALT 250 FOR IP): Performed by: INTERNAL MEDICINE

## 2022-02-10 PROCEDURE — 99205 OFFICE O/P NEW HI 60 MIN: CPT | Performed by: INTERNAL MEDICINE

## 2022-02-10 RX ORDER — DEXTROSE MONOHYDRATE 25 G/50ML
12.5 INJECTION, SOLUTION INTRAVENOUS PRN
Status: DISCONTINUED | OUTPATIENT
Start: 2022-02-10 | End: 2022-02-10 | Stop reason: HOSPADM

## 2022-02-10 RX ORDER — NICOTINE POLACRILEX 4 MG
15 LOZENGE BUCCAL PRN
Status: DISCONTINUED | OUTPATIENT
Start: 2022-02-10 | End: 2022-02-10 | Stop reason: HOSPADM

## 2022-02-10 RX ORDER — DEXTROSE MONOHYDRATE 50 MG/ML
100 INJECTION, SOLUTION INTRAVENOUS PRN
Status: DISCONTINUED | OUTPATIENT
Start: 2022-02-10 | End: 2022-02-10 | Stop reason: HOSPADM

## 2022-02-10 RX ADMIN — ASPIRIN 81 MG 81 MG: 81 TABLET ORAL at 12:09

## 2022-02-10 RX ADMIN — HYDROCHLOROTHIAZIDE 25 MG: 25 TABLET ORAL at 12:10

## 2022-02-10 RX ADMIN — PRAVASTATIN SODIUM 40 MG: 40 TABLET ORAL at 12:10

## 2022-02-10 RX ADMIN — TETROFOSMIN 30 MILLICURIE: 1.38 INJECTION, POWDER, LYOPHILIZED, FOR SOLUTION INTRAVENOUS at 09:33

## 2022-02-10 RX ADMIN — SODIUM CHLORIDE, PRESERVATIVE FREE 10 ML: 5 INJECTION INTRAVENOUS at 12:11

## 2022-02-10 RX ADMIN — METOPROLOL SUCCINATE 50 MG: 50 TABLET, EXTENDED RELEASE ORAL at 12:10

## 2022-02-10 RX ADMIN — TETROFOSMIN 10 MILLICURIE: 1.38 INJECTION, POWDER, LYOPHILIZED, FOR SOLUTION INTRAVENOUS at 07:22

## 2022-02-10 RX ADMIN — LISINOPRIL 10 MG: 10 TABLET ORAL at 12:10

## 2022-02-10 RX ADMIN — INSULIN LISPRO 1 UNITS: 100 INJECTION, SOLUTION INTRAVENOUS; SUBCUTANEOUS at 01:19

## 2022-02-10 ASSESSMENT — PAIN SCALES - GENERAL
PAINLEVEL_OUTOF10: 0

## 2022-02-10 NOTE — ED PROVIDER NOTES
[address] ENCOUNTER      Pt Name: Hedwig Dandy  MRN: [id number]  [place] 1955  Date of evaluation: 2/9/2022  Provider: Halley Salazar MD    CHIEF COMPLAINT       Chief Complaint   Patient presents with    Chest Pain     c/o left side chest pain x 2 days. Today pain is going down left arm      Chest pain. HISTORY OF PRESENT ILLNESS   (Location/Symptom, Timing/Onset,Context/Setting, Quality, Duration, Modifying Factors, Severity)  Note limiting factors. Hedwig Dandy is a 73 y.o. male who presents to the emergency department c/o chest pain x 1.5 days. Onset gradual, now worsening, started while watching Netflix last evening. Came to the ED after pain started shooting down his L arm, further associated with lightheadedness since the pain came on. Denies lifting anything heavy or strenuous activity. Denies SOB, n/v, fevers, chills, cough, diaphoresis. Symptoms not otherwise alleviated or exacerbated by other factors. NursingNotes were reviewed. REVIEW OF SYSTEMS    (2-9 systems for level 4, 10 or more for level 5)       Constitutional: No fever or chills. Eye: No visual disturbances. No eye pain. Ear/Nose/Mouth/Throat: No nasal congestion. No sore throat. Respiratory: No cough, No shortness of breath, No sputum production. Cardiovascular: + chest pain. No palpitations. Gastrointestinal: No abdominal pain. No nausea or vomiting  Genitourinary: No dysuria. No hematuria. Hematology/Lymphatics: No bleeding or bruising tendency. Immunologic: No malaise. No swollen glands. Musculoskeletal: No back pain. No joint pain. Integumentary: No rash. No abrasions. Neurologic: No headache. No focal numbness or weakness.       PAST MEDICAL HISTORY     Past Medical History:   Diagnosis Date    Arthritis     Diabetes mellitus ([address]     History of kidney stones     Hyperlipidemia     Hypertension     Kidney stone     Wears glasses reading         SURGICALHISTORY       Past Surgical History:   Procedure Laterality Date    ABSCESS DRAINAGE      of pubic hair in scrotum    LITHOTRIPSY      SHOULDER ARTHROSCOPY Left 11/4/2020    ARTHROSCOPY LABRAL DEBRIDEMENT , CHONDROPLASTY OF GLENOID,OPEN GRABIEL SUBACROMIAL DECOMPRESSION AND ROTATOR CUFF REPAIR-LEFT SHOULDER performed by Pato Champion MD at . [address]       Previous Medications    ALLOPURINOL (ZYLOPRIM) 100 MG TABLET    Take 100 mg by mouth daily    ASPIRIN 81 MG TABLET    Take 81 mg by mouth daily    HYDROCHLOROTHIAZIDE (HYDRODIURIL) 25 MG TABLET    Take 25 mg by mouth daily    HYDROXYZINE (ATARAX) 25 MG TABLET    Take 25 mg by mouth every other day ev    IBUPROFEN (ADVIL;MOTRIN) 800 MG TABLET    Take 1 tablet by mouth 2 times daily (with meals)    LISINOPRIL (PRINIVIL;ZESTRIL) 10 MG TABLET    Take 10 mg by mouth daily    METFORMIN (GLUCOPHAGE) 500 MG TABLET    Take 1,000 mg by mouth 2 times daily (with meals)     METOPROLOL SUCCINATE (TOPROL XL) 50 MG EXTENDED RELEASE TABLET    TAKE 1 TABLET BY MOUTH DAILY    PRAVASTATIN (PRAVACHOL) 40 MG TABLET    Take 40 mg by mouth daily       ALLERGIES     Patient has no known allergies.     FAMILY HISTORY       Family History   Problem Relation Age of Onset    High Blood Pressure Mother     Stroke Father           SOCIAL HISTORY       Social History     Socioeconomic History    Marital status:      Spouse name: None    Number of children: None    Years of education: None    Highest education level: None   Occupational History    None   Tobacco Use    Smoking status: Never Smoker    Smokeless tobacco: Never Used   Vaping Use    Vaping Use: Never used   Substance and Sexual Activity    Alcohol use: No    Drug use: Never    Sexual activity: Yes     Partners: Female   Other Topics Concern    None   Social History Narrative    None     Social Determinants of Health     Financial Resource Strain:     Difficulty of Paying Living Expenses: Not on file   Food Insecurity:     Worried About Running Out of Food in the Last Year: Not on file    Ran Out of Food in the Last Year: Not on file   Transportation Needs:     Lack of Transportation (Medical): Not on file    Lack of Transportation (Non-Medical): Not on file   Physical Activity:     Days of Exercise per Week: Not on file    Minutes of Exercise per Session: Not on file   Stress:     Feeling of Stress : Not on file   Social Connections:     Frequency of Communication with Friends and Family: Not on file    Frequency of Social Gatherings with Friends and Family: Not on file    Attends Anabaptist Services: Not on file    Active Member of [address] [place] [address] [employer] or Organizations: Not on file    Attends Club or Organization Meetings: Not on file    Marital Status: Not on file   Intimate Partner Violence:     Fear of Current or Ex-Partner: Not on file    Emotionally Abused: Not on file    Physically Abused: Not on file    Sexually Abused: Not on file   Housing Stability:     Unable to Pay for Housing in the Last Year: Not on file    Number of [place] in the Last Year: Not on file    Unstable Housing in the Last Year: Not on file       SCREENINGS    [place] Coma Scale  Eye Opening: Spontaneous  Best Verbal Response: Oriented  Best Motor Response: Obeys commands  [place] Coma Scale Score: 15        PHYSICAL EXAM    (up to 7 for level 4, 8 or more for level 5)     ED Triage Vitals [02/09/22 1547]   BP Temp Temp Source Pulse Resp SpO2 Height Weight   (!) 162/85 99 °F (37.2 °C) Tympanic 67 16 98 % 5' 8\" (1.727 m) 203 lb 4.2 oz (92.2 kg)       General: Alert and oriented appropriately for age, No acute distress. Eye: Normal conjunctiva. Pupils equal and reactive. HENT: Oral mucosa is moist.  Respiratory: Respirations even and non-labored. Lungs CTAB. Cardiovascular: Normal rate, Regular rhythm. Intact peripheral pulses.   Gastrointestinal: Soft, Non-tender, 22   BP: 114/72 126/81 137/81 133/76   Pulse: 59 57 57 60   Resp:    Temp:       TempSrc:       SpO2: 97% 97% 97% 98%   Weight:       Height:             Medical decision makin-year-old male with medical history of hypertension, hyperlipidemia, diabetes who presents to the emergency department for chest pain that started radiating down his left arm prior to arrival, associated with lightheadedness over the past day and a half as well, not described as exertional but given the distribution of the pain associated with lightheadedness and subsequent improvement after administration of nitroglycerin, concern for ACS. Given aspirin, patient chest pain-free after nitro. Admitted to the hospitalist for ACS rule out. Hemodynamically stable on serial reassessments. EKG showed LVH pattern with nonspecific ST abnormality. Troponin was negative. Medications   nitroGLYCERIN (NITROSTAT) SL tablet 0.4 mg (0.4 mg SubLINGual Given 22 1637)   aspirin chewable tablet 324 mg (324 mg Oral Given 22 1602)                FINAL IMPRESSION      1.  Acute chest pain          DISPOSITION/PLAN   DISPOSITION Admitted 2022 05:29:44 PM           (Please note that portions of this note were completed with a voice recognition program.Efforts were made to edit the dictations but occasionally words are mis-transcribed.)    Luis A Duncan MD (electronically signed)  Attending Emergency Physician          Luis A Duncan MD  02/10/22 1041

## 2022-02-10 NOTE — PROGRESS NOTES
Spoke with Dr Kyle Jaeger, pt ok to discharge at this time. IV removed. Telemetry removed. Discharge instructions reviewed with pt, all questions and concerns addressed. Verbalized understanding of follow ups needed, no changes to home medications. Pt discharged home ambulatory with wife at this time.

## 2022-02-10 NOTE — DISCHARGE SUMMARY
Hospital Medicine Discharge Summary    Patient ID: Racquel Loo      Patient's PCP: Taylor Kahn MD    Admit Date: 2/9/2022     Discharge Date:   02/10/2022    Admitting Provider: Dale Osei MD     Discharge Provider: Rhett Berumen MD     Discharge Diagnoses: Active Hospital Problems    Diagnosis     Chest pain [R07.9]        The patient was seen and examined on day of discharge and this discharge summary is in conjunction with any daily progress note from day of discharge. Hospital Course:     From HPI:\"The patient is a 73 y.o. male who presents to [facility] with PMHx: Arthritis, BPH, DM, kidney stones, HLD, HTN, Covid November 2021, CKD stage III     No anticoagulation therapy,   employed full-time as a   Lives at home with wife  CODE STATUS full     Patient presented to [facility] emergency department for complaints of left-sided chest discomfort/pain that felt like a cramp. Nontraumatic in nature. Onset Monday evening while sitting and watching TV. Was persistent throughout Tuesday and then today had the same persistent discomfort, still feeling like a cramp but had the left arm and hand numbness and tingling. Stated it felt his hand and arm was going to sleep. No associated nausea, vomiting, diaphoresis, lightheadedness, back pain, jaw pain. Has never experienced this type of discomfort before. Has not noticed any exertional shortness of breath, difficulty sleeping. Positive for fatigue for the last couple of days. Negative for cough, fever. Negative for abdominal pain, nausea, vomiting or diarrhea.     ED work-up: EKG indicating sinus rhythm with LVH rate 65. No ST elevation or depression. Troponin is less than 0.01. CBC metabolic panel is pretty unremarkable.   Admission was requested for chest pain rule out due to patient's risk factors that include age, positive family history, HLD, HTN and DM.  Patient was given aspirin and sublingual nitro.     On my exam the patient is resting comfortably in bed. He is currently asymptomatic. He has no chest discomfort. He also reports that during the period of time that he was having chest discomfort his left side of his chest actually was tender to the touch. Currently chest wall and chest is nontender. Lung fields are clear heart tones unremarkable without murmur rub or gallop. No evidence of peripheral edema or JVD.     Negative for tobacco smoking,  Patient is fairly active as again he is full-time employed. He wears a fit bit and states that his average steps per day ranges between 10 -  15,000. \"      1. Chest pain, admit to the hospital, troponin negative, telemetry monitoring, cardiology consulted, nuclear stress test negative patient will be discharged home, no further chest pain likely musculoskeletal.  Patient advised to seek immediate medical help in case of new symptoms  2. Diabetes mellitus, resume home regimen  3. Essential hypertension, resume p.o. medications            Physical Exam Performed:     BP (!) 156/83   Pulse 66   Temp 97.7 °F (36.5 °C) (Oral)   Resp 17   Ht 5' 8\" (1.727 m)   Wt 199 lb (90.3 kg)   SpO2 99%   BMI 30.26 kg/m²       General appearance:  No apparent distress  HEENT:  Normal cephalic. Respiratory:  Normal respiratory effort. Clear to auscultation, bilaterally without Rales/Wheezes/Rhonchi. Cardiovascular:  Regular rate and rhythm with normal S1/S2 without murmurs, rubs or gallops. Abdomen: Soft, non-tender  Musculoskeletal:  No clubbing, cyanosis or edema bilaterally. Full range of motion without deformity. Neurologic:  NO FOCAL WEAKNESS  Psychiatric:  Alert and oriented  Capillary Refill: Brisk,< 3 seconds   Peripheral Pulses: +2 palpable, equal bilaterally       Labs:  For convenience and continuity at follow-up the following most recent labs are provided:      CBC:    Lab Results   Component Value Date WBC 7.0 02/10/2022    HGB 14.0 02/10/2022    HCT 40.5 02/10/2022     02/10/2022       Renal:    Lab Results   Component Value Date     02/09/2022    K 3.8 02/09/2022     02/09/2022    CO2 26 02/09/2022    BUN 19 02/09/2022    CREATININE 1.2 02/09/2022    CALCIUM 10.0 02/09/2022         Significant Diagnostic Studies    Radiology:   NM Cardiac Stress Test Nuclear Imaging   Final Result      XR CHEST (2 VW)   Final Result   No acute cardiopulmonary process. Consults:     IP CONSULT TO CARDIOLOGY    Disposition:  home     Condition at Discharge: Stable    Discharge Instructions/Follow-up:  PCP    Code Status:  Full Code     Activity: activity as tolerated    Diet: cardiac diet and diabetic diet      Discharge Medications:     Current Discharge Medication List           Details   ibuprofen (ADVIL;MOTRIN) 800 MG tablet Take 1 tablet by mouth 2 times daily (with meals)  Qty: 60 tablet, Refills: 1      metFORMIN (GLUCOPHAGE) 500 MG tablet Take 1,000 mg by mouth 2 times daily (with meals)   Refills: 0      metoprolol succinate (TOPROL XL) 50 MG extended release tablet TAKE 1 TABLET BY MOUTH DAILY      allopurinol (ZYLOPRIM) 100 MG tablet Take 100 mg by mouth daily      lisinopril (PRINIVIL;ZESTRIL) 10 MG tablet Take 10 mg by mouth daily      hydrochlorothiazide (HYDRODIURIL) 25 MG tablet Take 25 mg by mouth daily      pravastatin (PRAVACHOL) 40 MG tablet Take 40 mg by mouth daily      hydrOXYzine (ATARAX) 25 MG tablet Take 25 mg by mouth every other day ev      aspirin 81 MG tablet Take 81 mg by mouth daily             Time Spent on discharge is more than 45 minutes in the examination, evaluation, counseling and review of medications and discharge plan. Signed:    Carmen Jama MD   2/10/2022      Thank you Vishnu Canales MD for the opportunity to be involved in this patient's care.  If you have any questions or concerns please feel free to contact me at [phone].

## 2022-02-10 NOTE — FLOWSHEET NOTE
4 Eyes Skin Assessment     NAME:  Melquiades Conley  YOB: 1955  MEDICAL RECORD NUMBER:  [id number]    The patient is being assess for  Admission    I agree that 2 RN's have performed a thorough Head to Toe Skin Assessment on the patient. ALL assessment sites listed below have been assessed. Areas assessed by both nurses:    Head, Face, Ears, Shoulders, Back, Chest, Arms, Elbows, Hands, Sacrum. Buttock, Coccyx, Ischium and Legs. Feet and Heels        Does the Patient have a Wound?  No noted wound(s)       Dick Prevention initiated:  NA   Wound Care Orders initiated:  NA    Pressure Injury (Stage 3,4, Unstageable, DTI, NWPT, and Complex wounds) if present place consult order under [de-identified] NA    New and Established Ostomies if present place consult order under : NA      Nurse 1 eSignature: Electronically signed by Dany Jarvis RN on 2/9/22 at 11:19 PM EST    **SHARE this note so that the co-signing nurse is able to place an eSignature**    Nurse 2 eSignature: Electronically signed by Kylah Castaneda RN on 2/9/22 at 11:20 PM EST

## 2022-02-10 NOTE — ED NOTES
Pt resting quietly. States pain is gone at present, states he feels an occasional \"twinge\" in his left chest with movement.       Joan Chacon RN  02/09/22 1000

## 2022-02-10 NOTE — FLOWSHEET NOTE
Patient arrived via stretcher from [facility] ED to Room 4113 at 2120. Patient came to ED from home after experiencing chest pain x 2 days with slight pain on his left arm which concerned him. Troponin level WNL. To be checked every 3 hours. Lelo Luis CNP came in and ordered Cardiac stress test in the a.m. Patient to be NPO at midnight. Patient alert and oriented x 4. Transferred self from stretcher to bed without any issues. Walked to the bathroom to urinate. Patient pleasant and cooperative. Stated he hasn't eaten since lunch. Ate his turkey sandwich in his room and just had a cup of ice water. Telemonitoring leads on Patient. Patient oriented to his room including TV and call lights.

## 2022-02-10 NOTE — H&P
Hospital Medicine History & Physical      PCP: Mayte Connolly MD    Date of Admission: 2/9/2022    Date of Service: Pt seen/examined on 2/9/2022 and Admitted to Inpatient  Placed in Observation. Chief Complaint: Left-sided chest pain      History Of Present Illness: The patient is a 73 y.o. male who presents to [facility] with PMHx: Arthritis, BPH, DM, kidney stones, HLD, HTN, Covid November 2021, CKD stage III    No anticoagulation therapy,   employed full-time as a   Lives at home with wife  CODE STATUS full    Patient presented to [facility] emergency department for complaints of left-sided chest discomfort/pain that felt like a cramp. Nontraumatic in nature. Onset Monday evening while sitting and watching TV. Was persistent throughout Tuesday and then today had the same persistent discomfort, still feeling like a cramp but had the left arm and hand numbness and tingling. Stated it felt his hand and arm was going to sleep. No associated nausea, vomiting, diaphoresis, lightheadedness, back pain, jaw pain. Has never experienced this type of discomfort before. Has not noticed any exertional shortness of breath, difficulty sleeping. Positive for fatigue for the last couple of days. Negative for cough, fever. Negative for abdominal pain, nausea, vomiting or diarrhea. ED work-up: EKG indicating sinus rhythm with LVH rate 65. No ST elevation or depression. Troponin is less than 0.01. CBC metabolic panel is pretty unremarkable. Admission was requested for chest pain rule out due to patient's risk factors that include age, positive family history, HLD, HTN and DM. Patient was given aspirin and sublingual nitro. On my exam the patient is resting comfortably in bed. He is currently asymptomatic.   He has no chest discomfort. He also reports that during the period of time that he was having chest discomfort his left side of his chest actually was tender to the touch. Currently chest wall and chest is nontender. Lung fields are clear heart tones unremarkable without murmur rub or gallop. No evidence of peripheral edema or JVD. Negative for tobacco smoking,  Patient is fairly active as again he is full-time employed. He wears a fit bit and states that his average steps per day ranges between 10 -  15,000. Past Medical History:        Diagnosis Date    Arthritis     Diabetes mellitus ([address]     History of kidney stones     Hyperlipidemia     Hypertension     Kidney stone     Wears glasses     reading       Past Surgical History:        Procedure Laterality Date    ABSCESS DRAINAGE      of pubic hair in scrotum    LITHOTRIPSY      SHOULDER ARTHROSCOPY Left 11/4/2020    ARTHROSCOPY LABRAL DEBRIDEMENT , CHONDROPLASTY OF GLENOID,OPEN GRABIEL SUBACROMIAL DECOMPRESSION AND ROTATOR CUFF REPAIR-LEFT SHOULDER performed by Abner Medina MD at [place] [address]       Medications Prior to Admission:    Prior to Admission medications    Medication Sig Start Date End Date Taking?  Authorizing Provider   ibuprofen (ADVIL;MOTRIN) 800 MG tablet Take 1 tablet by mouth 2 times daily (with meals)  Patient taking differently: Take 800 mg by mouth See Admin Instructions Takes every third day @ night 9/23/20  Yes Abner Medina MD   metFORMIN (GLUCOPHAGE) 500 MG tablet Take 1,000 mg by mouth 2 times daily (with meals)  11/26/18  Yes Historical Provider, MD   metoprolol succinate (TOPROL XL) 50 MG extended release tablet TAKE 1 TABLET BY MOUTH DAILY 11/26/18  Yes Historical Provider, MD   allopurinol (ZYLOPRIM) 100 MG tablet Take 100 mg by mouth daily   Yes Historical Provider, MD   lisinopril (PRINIVIL;ZESTRIL) 10 MG tablet Take 10 mg by mouth daily   Yes Historical Provider, MD   hydrochlorothiazide (HYDRODIURIL) 25 MG tablet Take 25 mg by mouth daily   Yes Historical Provider, MD   pravastatin (PRAVACHOL) 40 MG tablet Take 40 mg by mouth daily   Yes Historical Provider, MD   hydrOXYzine (ATARAX) 25 MG tablet Take 25 mg by mouth every other day ev   Yes Historical Provider, MD   aspirin 81 MG tablet Take 81 mg by mouth daily   Yes Historical Provider, MD       Allergies:  Patient has no known allergies. Social History:  The patient currently lives at home. TOBACCO:   reports that he has never smoked. He has never used smokeless tobacco.  ETOH:   reports no history of alcohol use. Family History:  Reviewed in detail and negative for DM, Early CAD, Cancer, CVA. Positive as follows:        Problem Relation Age of Onset    High Blood Pressure Mother     Stroke Father        REVIEW OF SYSTEMS:   Positive for left sided CP and as noted in the HPI. All other systems reviewed and negative. PHYSICAL EXAM:    BP (!) 147/82   Pulse 57   Temp 97.5 °F (36.4 °C) (Oral)   Resp 16   Ht 5' 8\" (1.727 m)   Wt 199 lb 15.3 oz (90.7 kg)   SpO2 96%   BMI 30.40 kg/m²     General appearance: No apparent distress appears stated age and cooperative. HEENT Normal cephalic, atraumatic without obvious deformity. Pupils equal, round, and reactive to light. Extra ocular muscles intact. Conjunctivae/corneas clear. Neck: Supple, No jugular venous distention/bruits. Trachea midline without thyromegaly or adenopathy with full range of motion. Lungs: Clear to auscultation, bilaterally without Rales/Wheezes/Rhonchi with good respiratory effort. Heart: Regular rate and rhythm with Normal S1/S2 without murmurs, rubs or gallops, point of maximum impulse non-displaced  Abdomen: Soft, non-tender or non-distended without rigidity or guarding and positive bowel sounds all four quadrants. Extremities: No clubbing, cyanosis, or edema bilaterally. Full range of motion without deformity and normal gait intact.   Skin: Skin color, texture, turgor normal.  No rashes or lesions. Neurologic: Alert and oriented X 3, neurovascularly intact with sensory/motor intact upper extremities/lower extremities, bilaterally. Cranial nerves: II-XII intact, grossly non-focal.  Mental status: Alert, oriented, thought content appropriate. Capillary Refill: Acceptable  < 3 seconds  Peripheral Pulses: +3 Easily felt, not easily obliterated with pressure      CXR:  I have reviewed the CXR with the following interpretation: NAD  EKG:  I have reviewed the EKG with the following interpretation: Narrow complex NSR: Rate 65, GA interval 186, ,     CBC   Recent Labs     02/09/22  1559   WBC 7.8   HGB 13.8   HCT 40.7         RENAL  Recent Labs     02/09/22  1559      K 3.8      CO2 26   BUN 19   CREATININE 1.2     LFT'S  No results for input(s): AST, ALT, ALB, BILIDIR, BILITOT, ALKPHOS in the last 72 hours. COAG  No results for input(s): INR in the last 72 hours.   CARDIAC ENZYMES  Recent Labs     02/09/22  1559 02/09/22  2140   TROPONINI <0.01 <0.01       U/A:    Lab Results   Component Value Date    COLORU Yellow 09/02/2013    WBCUA 20-50 09/02/2013    RBCUA  09/02/2013    MUCUS 1+ 09/02/2013    BACTERIA 1+ 09/02/2013    CLARITYU Clear 09/02/2013    SPECGRAV >=1.030 09/02/2013    LEUKOCYTESUR SMALL 09/02/2013    BLOODU LARGE 09/02/2013    GLUCOSEU Negative 09/02/2013       ABG  No results found for: FQG9RPX, BEART, C1IHEKVK, PHART, THGBART, OSG8QSK, PO2ART, SXN1JQG        Active Hospital Problems    Diagnosis Date Noted    Chest pain [R07.9] 02/09/2022         PHYSICIANS CERTIFICATION:    I certify that Chepe Thompson is expected to be hospitalized for less than 2 midnights based on the following assessment and plan:      ASSESSMENT/PLAN:    Chest pain: DDx unstable angina, ACS, CAD, evolving MI, NSTEMI, musculoskeletal, GI  No history of cardiac work-up including treadmill stress test.  WELLS DVT/PE criteria score: 0.0  HEART score: 4  Initial EKG unremarkable-> repeat in a.m. Initial troponin is less than 0.01=> trend  Cardiology consult  Echocardiogram in a.m. Treadmill/NM in a.m. ASA  NTG sl prn  Patient declined prophylactic anticoagulation. 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 He states that he will be happy to be compliant if his stress test and cardiac evaluation indicates that he needs to be on an anticoagulant. Otherwise he would like to decline at this time. HLD: Continue pravastatin per home regimen    HTN: Continue lisinopril and metoprolol per home regimen    DM: Blood glucose 152, Accu-Cheks, hypoglycemic protocol, DM cardiac diet after diagnostic testing complete, holding Metformin for now, SSI  Patient reports his hemoglobin A1c generally runs about 6.5. DVT Prophylaxis: Ambulatory low risk for DVT  Diet: Diet NPO  Code Status: Full Code  PT/OT Eval Status: Independent    Dispo -admit, observation       Carolyn Luis, APRN - CNP    Thank you Katt Alba MD for the opportunity to be involved in this patient's care. If you have any questions or concerns please feel free to contact me at [phone].

## 2022-02-10 NOTE — FLOWSHEET NOTE
Patient slept all night. No pain complaint. Denies chest pain at this time. Goes to the BR independently. 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 Has his hospital gown on as well as non skid socks. Cardiac eval including stress test in a.m. NPO since midnight.

## 2022-02-10 NOTE — PROGRESS NOTES
Patient alert and oriented x4, VSS, sitting up in bed, family at bedside. Patient denies n/v, diarrhea, SOB, pain. Patient states no needs at this time, and is expecting to be discharged. Bed in lowest and locked position with bed alarm in place. Non-slip socks on, call light within reach. Will continue to monitor pt needs.

## 2022-02-10 NOTE — CONSULTS
[facility]  Cardiology Consult Note        CC:      Chest pain         HPI:   This is a 73 y.o. male here for evaluation of chest pain. The pain is on the left side of the chest it is a knot-like feeling. It started when he was watching TV lasted most of the night the next day as well while he is driving home from work he has some numbness in his arm which prompted him to come to the hospital.  In the ER he was given nitroglycerin with improvement in his symptoms which is why he was admitted to the hospital.    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 He is borderline diabetic and is on cholesterol-lowering medicines.   No family history for coronary disease at a young age      Past Medical History:   Diagnosis Date    Arthritis     Diabetes mellitus ([address]     History of kidney stones     Hyperlipidemia     Hypertension     Kidney stone     Wears glasses     reading      Past Surgical History:   Procedure Laterality Date    ABSCESS DRAINAGE      of pubic hair in scrotum    LITHOTRIPSY      SHOULDER ARTHROSCOPY Left 11/4/2020    ARTHROSCOPY LABRAL DEBRIDEMENT , CHONDROPLASTY OF GLENOID,OPEN [place] SUBACROMIAL DECOMPRESSION AND ROTATOR CUFF REPAIR-LEFT SHOULDER performed by Porter Fan MD at [address] [place] [address] History   Problem Relation Age of Onset    High Blood Pressure Mother     Stroke Father       Social History     Tobacco Use    Smoking status: Never Smoker    Smokeless tobacco: Never Used   Vaping Use    Vaping Use: Never used   Substance Use Topics    Alcohol use: No    Drug use: Never      No Known Allergies   insulin lispro  0-6 Units SubCUTAneous TID WC    insulin lispro  0-3 Units SubCUTAneous Nightly    hydroCHLOROthiazide  25 mg Oral Daily    lisinopril  10 mg Oral Daily    metoprolol succinate  50 mg Oral Daily    pravastatin  40 mg Oral Daily    sodium chloride flush  5-40 mL IntraVENous 2 times per day    aspirin  81 mg Oral Daily    [Held by provider] enoxaparin  40 mg SubCUTAneous Daily       Review of Systems -   Constitutional: Negative for weight gain/loss; malaise, fever  Respiratory: Negative for Asthma;  cough and hemoptysis  Cardiovascular: Negative for palpitations,dizziness   Gastrointestinal: Negative for abd.pain; constipation/diarrhea;    Genitourinary: Negative for stones; hematuria; frequency hesitancy  Integumentt: Negative for rash or pruritis  Hematologic/lymphatic: Negative for blood dyscrasia; leukemia/lymphoma  Musculoskeletal: Negative for Connective tissue disease  Neurological:  Negative for Seizure   Behavioral/Psych:Negative for Bipolar disorder, Schizophrenia; Dementia  Endocrine: negative for thyroid, parathyroid disease      Intake/Output Summary (Last 24 hours) at 2/10/2022 1003  Last data filed at 2/9/2022 2222  Gross per 24 hour   Intake 120 ml   Output --   Net 120 ml       Physical Examination:    /78   Pulse 62   Temp 97.9 °F (36.6 °C) (Oral)   Resp 16   Ht 5' 8\" (1.727 m)   Wt 199 lb (90.3 kg)   SpO2 96%   BMI 30.26 kg/m²    HEENT:  Face: Atraumatic, Conjunctiva: Pink; non icteric,  Mucous Memb:  Moist, No thyromegaly or Lymphadenopathy  Respiratory:  Resp Assessment: normal, Resp Auscultation: clear   Cardiovascular: Auscultation: nl S1 & S2, Palpation:  Nl PMI;  No heaves or thrills, JVP:  normal  Abdomen: Soft, non-tender, Normal bowel sounds,  No organomegaly  Extremities: No Cyanosis or Clubbing; Edema none  Neurological: Oriented to time, place, and person, Non-anxious  Psychiatric: Normal mood and affect  Skin: Warm and dry,  No rash seen      Current Facility-Administered Medications: glucose (GLUTOSE) 40 % oral gel 15 g, 15 g, Oral, PRN  dextrose 50 % IV solution, 12.5 g, IntraVENous, PRN  glucagon (rDNA) injection 1 mg, 1 mg, IntraMUSCular, PRN  dextrose 5 % solution, 100 mL/hr, IntraVENous, PRN  insulin lispro (HUMALOG) injection vial 0-6 Units, 0-6 Units, SubCUTAneous, TID WC  insulin lispro (HUMALOG) injection vial 0-3 Units, 0-3 Units, SubCUTAneous, Nightly  nitroGLYCERIN (NITROSTAT) SL tablet 0.4 mg, 0.4 mg, SubLINGual, Q5 Min PRN  hydroCHLOROthiazide (HYDRODIURIL) tablet 25 mg, 25 mg, Oral, Daily  lisinopril (PRINIVIL;ZESTRIL) tablet 10 mg, 10 mg, Oral, Daily  metoprolol succinate (TOPROL XL) extended release tablet 50 mg, 50 mg, Oral, Daily  pravastatin (PRAVACHOL) tablet 40 mg, 40 mg, Oral, Daily  sodium chloride flush 0.9 % injection 5-40 mL, 5-40 mL, IntraVENous, 2 times per day  sodium chloride flush 0.9 % injection 5-40 mL, 5-40 mL, IntraVENous, PRN  0.9 % sodium chloride infusion, 25 mL, IntraVENous, PRN  ondansetron (ZOFRAN-ODT) disintegrating tablet 4 mg, 4 mg, Oral, Q8H PRN **OR** ondansetron (ZOFRAN) injection 4 mg, 4 mg, IntraVENous, Q6H PRN  acetaminophen (TYLENOL) tablet 650 mg, 650 mg, Oral, Q6H PRN **OR** acetaminophen (TYLENOL) suppository 650 mg, 650 mg, Rectal, Q6H PRN  polyethylene glycol (GLYCOLAX) packet 17 g, 17 g, Oral, Daily PRN  aspirin chewable tablet 81 mg, 81 mg, Oral, Daily  perflutren lipid microspheres (DEFINITY) injection 1.65 mg, 1.5 mL, IntraVENous, ONCE PRN  [Held by provider] enoxaparin (LOVENOX) injection 40 mg, 40 mg, SubCUTAneous, Daily      Labs:   Recent Labs     02/09/22  1559 02/10/22  0524   WBC 7.8 7.0   HGB 13.8 14.0   HCT 40.7 40.5    241     Recent Labs     02/09/22  1559      K 3.8   CO2 26   BUN 19   CREATININE 1.2   GLUCOSE 152*     No results for input(s): BNP in the last 72 hours. No results for input(s): PROTIME, INR in the last 72 hours. No results for input(s): APTT in the last 72 hours. Recent Labs     02/09/22  1559 02/09/22  2140 02/10/22  0023   TROPONINI <0.01 <0.01 <0.01     Lab Results   Component Value Date    HDL 30 02/10/2022    LDLCALC 78 02/10/2022    TRIG 108 02/10/2022     No results for input(s): AST, ALT, LABALBU in the last 72 hours.       EKG:   Normal sinus rhythm    Stress Nuclear:  Exercised for 11 minutes without chest pain or ischemic changes  Myocardial perfusion scan is negative for  ischemia      ASSESSMENT AND PLAN:      Chest pain  Clinically the pain is noncardiac  If not like feeling on the left side of the chest not associated with activity or exertion  ECG is normal: Troponins are negative    Stress nuclear scan is also negative  Does not need an echocardiogram    Diabetes  Random sugars 150s  Patient on Metformin      Hyperlipidemia  LDL is 78 on atorvastatin      Zo Hopper M.D  2/10/2022

## 2022-02-10 NOTE — PLAN OF CARE
Problem: Infection:  Goal: Will remain free from infection  Description: Will remain free from infection  2/10/2022 1223 by Lyla Berry RN  Outcome: Ongoing  2/9/2022 2257 by Walter Hilario RN  Outcome: Ongoing     Problem: Safety:  Goal: Free from accidental physical injury  Description: Free from accidental physical injury  2/10/2022 1223 by Lyla Berry RN  Outcome: Ongoing  2/9/2022 2257 by Walter Hilario RN  Outcome: Ongoing  Goal: Free from intentional harm  Description: Free from intentional harm  2/10/2022 1223 by Lyla Berry RN  Outcome: Ongoing  2/9/2022 2257 by Walter Hilario RN  Outcome: Ongoing     Problem: Daily Care:  Goal: Daily care needs are met  Description: Daily care needs are met  2/10/2022 1223 by Lyla Berry RN  Outcome: Ongoing  2/9/2022 2257 by Walter Hilario RN  Outcome: Ongoing     Problem: Pain:  Goal: Patient's pain/discomfort is manageable  Description: Patient's pain/discomfort is manageable  2/10/2022 1223 by Lyla Berry RN  Outcome: Ongoing  2/9/2022 2257 by Walter Hilario RN  Outcome: Ongoing     Problem: Skin Integrity:  Goal: Skin integrity will stabilize  Description: Skin integrity will stabilize  2/10/2022 1223 by Lyla Berry RN  Outcome: Ongoing  2/9/2022 2257 by Walter Hilario RN  Outcome: Ongoing     Problem: Discharge Planning:  Goal: Patients continuum of care needs are met  Description: Patients continuum of care needs are met  2/10/2022 1223 by Lyla Berry RN  Outcome: Ongoing  2/9/2022 2257 by Walter Hilario RN  Outcome: Ongoing

## (undated) DEVICE — SPONGE LAP W18XL18IN WHT COT 4 PLY FLD STRUNG RADPQ DISP ST

## (undated) DEVICE — THIN OFFSET (9.0 X 0.38 X 25.0MM)

## (undated) DEVICE — ELECTRODE PT RET AD L9FT HI MOIST COND ADH HYDRGEL CORDED

## (undated) DEVICE — SUTURE ABSORBABLE BRAIDED 2-0 CT-1 27 IN UD VICRYL J259H

## (undated) DEVICE — COVER LT HNDL BLU PLAS

## (undated) DEVICE — Z INACTIVE USE 2660664 SOLUTION IRRIG 3000ML 0.9% SOD CHL USP UROMATIC PLAS CONT

## (undated) DEVICE — DRAPE,SHOULDER,BEACH CHAIR,STERILE: Brand: MEDLINE

## (undated) DEVICE — IMMOBILIZER SHLDR SWTH UNIV DEV BLK P.A.D. III

## (undated) DEVICE — 3M™ STERI-DRAPE™ U-DRAPE 1015: Brand: STERI-DRAPE™

## (undated) DEVICE — SUTURE NONABSORBABLE MONOFILAMENT 4-0 FS-2 18 IN ETHILON 662H

## (undated) DEVICE — SUTURE ETHLN SZ 4-0 L18IN NONABSORBABLE BLK L16MM PS-4 1/2 1662G

## (undated) DEVICE — SUTURE MCRYL SZ 4-0 L18IN ABSRB UD L19MM PS-2 3/8 CIR PRIM Y496G

## (undated) DEVICE — STRIP,CLOSURE,WOUND,MEDI-STRIP,1/2X4: Brand: MEDLINE

## (undated) DEVICE — MAJOR SET UP PK

## (undated) DEVICE — PROBE ABLAT XL 90DEG ASPIR BPLR RF 1 PC ELECTRD ERGO HNDL

## (undated) DEVICE — DRESSING,GAUZE,XEROFORM,CURAD,1"X8",ST: Brand: CURAD

## (undated) DEVICE — INTENDED TO SUPPORT AND MAINTAIN THE POSITION OF AN ANESTHETIZED PATIENT DURING SURGERY: Brand: ERIN BEACH CHAIR FACE MASK

## (undated) DEVICE — TUBING, SUCTION, 1/4" X 12', STRAIGHT: Brand: MEDLINE

## (undated) DEVICE — INTENDED FOR TISSUE SEPARATION, AND OTHER PROCEDURES THAT REQUIRE A SHARP SURGICAL BLADE TO PUNCTURE OR CUT.: Brand: BARD-PARKER ® STAINLESS STEEL BLADES

## (undated) DEVICE — HYPODERMIC SAFETY NEEDLE: Brand: MAGELLAN

## (undated) DEVICE — DRAPE,U/SHT,SPLIT,FILM,60X84,STERILE: Brand: MEDLINE

## (undated) DEVICE — APPLICATOR MEDICATED 26 CC SOLUTION HI LT ORNG CHLORAPREP

## (undated) DEVICE — GOWN SIRUS NONREIN XL W/TWL: Brand: MEDLINE INDUSTRIES, INC.

## (undated) DEVICE — SPONGE GZ W4XL4IN COT 12 PLY TYP VII WVN C FLD DSGN

## (undated) DEVICE — SHOULDER CANNULA SET WITHOUT FENESTRATIONS, 5.5 MM (I.D.) X 70 MM: Brand: CONMED

## (undated) DEVICE — BLADE SHV L13CM DIA4MM EXCALIBUR AGG COOLCUT

## (undated) DEVICE — MERCY HEALTH WEST TURNOVER: Brand: MEDLINE INDUSTRIES, INC.

## (undated) DEVICE — DRAPE,REIN 53X77,STERILE: Brand: MEDLINE

## (undated) DEVICE — 4-PORT MANIFOLD: Brand: NEPTUNE 2

## (undated) DEVICE — SUTURE VCRL SZ 1 L27IN ABSRB UD CT-1 L36MM 1/2 CIR J261H

## (undated) DEVICE — 3M™ COBAN™ NL STERILE NON-LATEX SELF-ADHERENT WRAP, 2084S, 4 IN X 5 YD (10 CM X 4,5 M), 18 ROLLS/CASE: Brand: 3M™ COBAN™

## (undated) DEVICE — PAD DRY FLOOR ABS 32X58IN GRN

## (undated) DEVICE — NEEDLE SPNL L3.5IN PNK HUB S STL REG WALL FIT STYL W/ QNCKE

## (undated) DEVICE — 3M™ COBAN™ NL STERILE NON-LATEX SELF-ADHERENT WRAP, 2086S, 6 IN X 5 YD (15 CM X 4,5 M), 12 ROLLS/CASE: Brand: 3M™ COBAN™

## (undated) DEVICE — SYRINGE 20ML LL S/C 50

## (undated) DEVICE — SYRINGE MED 50ML LUERLOCK TIP

## (undated) DEVICE — SOLUTION IV IRRIG POUR BRL 0.9% SODIUM CHL 2F7124

## (undated) DEVICE — TUBING PMP L16FT MAIN DISP FOR AR-6400 AR-6475

## (undated) DEVICE — 3M™ STERI-STRIP™ COMPOUND BENZOIN TINCTURE 40 BAGS/CARTON 4 CARTONS/CASE C1544: Brand: 3M™ STERI-STRIP™

## (undated) DEVICE — SUTURE N ABSRB BRAIDED 2-0 OS-4 30 IN GRN ETHBND EXCEL X519H

## (undated) DEVICE — 3M™ STERI-DRAPE™ INSTRUMENT POUCH 1018: Brand: STERI-DRAPE™

## (undated) DEVICE — 3M™ TEGADERM™ TRANSPARENT FILM DRESSING FRAME STYLE, 1626W, 4 IN X 4-3/4 IN (10 CM X 12 CM), 50/CT 4CT/CASE: Brand: 3M™ TEGADERM™

## (undated) DEVICE — GLOVE SURG SZ 85 L12IN FNGR THK94MIL STD WHT LTX FREE

## (undated) DEVICE — GLOVE ORANGE PI 8 1/2   MSG9085